# Patient Record
Sex: MALE | Race: WHITE | NOT HISPANIC OR LATINO | Employment: FULL TIME | URBAN - METROPOLITAN AREA
[De-identification: names, ages, dates, MRNs, and addresses within clinical notes are randomized per-mention and may not be internally consistent; named-entity substitution may affect disease eponyms.]

---

## 2018-03-19 ENCOUNTER — TRANSFERRED RECORDS (OUTPATIENT)
Dept: HEALTH INFORMATION MANAGEMENT | Facility: CLINIC | Age: 48
End: 2018-03-19

## 2018-03-20 ENCOUNTER — MEDICAL CORRESPONDENCE (OUTPATIENT)
Dept: HEALTH INFORMATION MANAGEMENT | Facility: CLINIC | Age: 48
End: 2018-03-20

## 2018-03-27 ENCOUNTER — TELEPHONE (OUTPATIENT)
Dept: RHEUMATOLOGY | Facility: CLINIC | Age: 48
End: 2018-03-27

## 2018-03-27 NOTE — TELEPHONE ENCOUNTER
Received referral from [facility] (sent to abstracting) to call and schedule appointment for patient to be seen win Rheumatology. Called and spoke with patient, patient was given a date and time that had been ok 'd by Dr. Bunn patient stated he has to work that day, another date was offered but patient stated he had a colonoscopy that day and third time and date was given but patient declined. Finally scheduled for August 6th, 2018 at 8:40 AM. [facility] had asked us to call them when appointment was made so I did call and let them know that I scheduled an appointment and explained why it was in August, the male tech I spoke with laughed and said that its ok.  Theresa Clay, KATELYNN  3/27/2018 4:06 PM

## 2018-08-06 ENCOUNTER — RADIANT APPOINTMENT (OUTPATIENT)
Dept: GENERAL RADIOLOGY | Facility: CLINIC | Age: 48
End: 2018-08-06
Attending: INTERNAL MEDICINE
Payer: COMMERCIAL

## 2018-08-06 ENCOUNTER — OFFICE VISIT (OUTPATIENT)
Dept: RHEUMATOLOGY | Facility: CLINIC | Age: 48
End: 2018-08-06
Payer: COMMERCIAL

## 2018-08-06 VITALS
WEIGHT: 187.2 LBS | HEART RATE: 84 BPM | RESPIRATION RATE: 16 BRPM | OXYGEN SATURATION: 99 % | DIASTOLIC BLOOD PRESSURE: 91 MMHG | SYSTOLIC BLOOD PRESSURE: 144 MMHG

## 2018-08-06 DIAGNOSIS — H20.9 UVEITIS: ICD-10-CM

## 2018-08-06 DIAGNOSIS — Z87.39 HX OF RHEUMATOID ARTHRITIS: ICD-10-CM

## 2018-08-06 DIAGNOSIS — H20.00 HLA-B27 ASSOCIATED ACUTE ANTERIOR UVEITIS: Primary | ICD-10-CM

## 2018-08-06 LAB
ALBUMIN SERPL-MCNC: 3.8 G/DL (ref 3.4–5)
ALP SERPL-CCNC: 70 U/L (ref 40–150)
ALT SERPL W P-5'-P-CCNC: 41 U/L (ref 0–70)
ANION GAP SERPL CALCULATED.3IONS-SCNC: 8 MMOL/L (ref 3–14)
AST SERPL W P-5'-P-CCNC: 24 U/L (ref 0–45)
B BURGDOR IGG+IGM SER QL: 0.06 (ref 0–0.89)
BASOPHILS # BLD AUTO: 0.1 10E9/L (ref 0–0.2)
BASOPHILS NFR BLD AUTO: 0.6 %
BILIRUB SERPL-MCNC: 0.5 MG/DL (ref 0.2–1.3)
BUN SERPL-MCNC: 14 MG/DL (ref 7–30)
CALCIUM SERPL-MCNC: 8.8 MG/DL (ref 8.5–10.1)
CHLORIDE SERPL-SCNC: 104 MMOL/L (ref 94–109)
CO2 SERPL-SCNC: 25 MMOL/L (ref 20–32)
CREAT SERPL-MCNC: 0.82 MG/DL (ref 0.66–1.25)
CRP SERPL-MCNC: 6.4 MG/L (ref 0–8)
DIFFERENTIAL METHOD BLD: NORMAL
EOSINOPHIL # BLD AUTO: 0.2 10E9/L (ref 0–0.7)
EOSINOPHIL NFR BLD AUTO: 3 %
ERYTHROCYTE [DISTWIDTH] IN BLOOD BY AUTOMATED COUNT: 12.9 % (ref 10–15)
ERYTHROCYTE [SEDIMENTATION RATE] IN BLOOD BY WESTERGREN METHOD: 8 MM/H (ref 0–20)
GFR SERPL CREATININE-BSD FRML MDRD: >90 ML/MIN/1.7M2
GLUCOSE SERPL-MCNC: 84 MG/DL (ref 70–99)
HBV CORE AB SERPL QL IA: NONREACTIVE
HBV SURFACE AG SERPL QL IA: NONREACTIVE
HCT VFR BLD AUTO: 44.8 % (ref 40–53)
HCV AB SERPL QL IA: NONREACTIVE
HGB BLD-MCNC: 15.1 G/DL (ref 13.3–17.7)
LYMPHOCYTES # BLD AUTO: 1.2 10E9/L (ref 0.8–5.3)
LYMPHOCYTES NFR BLD AUTO: 14.8 %
MCH RBC QN AUTO: 28.4 PG (ref 26.5–33)
MCHC RBC AUTO-ENTMCNC: 33.7 G/DL (ref 31.5–36.5)
MCV RBC AUTO: 84 FL (ref 78–100)
MONOCYTES # BLD AUTO: 1.1 10E9/L (ref 0–1.3)
MONOCYTES NFR BLD AUTO: 13.7 %
NEUTROPHILS # BLD AUTO: 5.4 10E9/L (ref 1.6–8.3)
NEUTROPHILS NFR BLD AUTO: 67.9 %
PLATELET # BLD AUTO: 366 10E9/L (ref 150–450)
POTASSIUM SERPL-SCNC: 4.7 MMOL/L (ref 3.4–5.3)
PROT SERPL-MCNC: 7.7 G/DL (ref 6.8–8.8)
RBC # BLD AUTO: 5.32 10E12/L (ref 4.4–5.9)
RHEUMATOID FACT SER NEPH-ACNC: <20 IU/ML (ref 0–20)
SODIUM SERPL-SCNC: 137 MMOL/L (ref 133–144)
WBC # BLD AUTO: 7.9 10E9/L (ref 4–11)

## 2018-08-06 PROCEDURE — 73130 X-RAY EXAM OF HAND: CPT | Mod: LT

## 2018-08-06 PROCEDURE — 86704 HEP B CORE ANTIBODY TOTAL: CPT | Performed by: INTERNAL MEDICINE

## 2018-08-06 PROCEDURE — 87340 HEPATITIS B SURFACE AG IA: CPT | Performed by: INTERNAL MEDICINE

## 2018-08-06 PROCEDURE — 86431 RHEUMATOID FACTOR QUANT: CPT | Performed by: INTERNAL MEDICINE

## 2018-08-06 PROCEDURE — 86200 CCP ANTIBODY: CPT | Performed by: INTERNAL MEDICINE

## 2018-08-06 PROCEDURE — 99244 OFF/OP CNSLTJ NEW/EST MOD 40: CPT | Performed by: INTERNAL MEDICINE

## 2018-08-06 PROCEDURE — 86618 LYME DISEASE ANTIBODY: CPT | Performed by: INTERNAL MEDICINE

## 2018-08-06 PROCEDURE — 85025 COMPLETE CBC W/AUTO DIFF WBC: CPT | Performed by: INTERNAL MEDICINE

## 2018-08-06 PROCEDURE — 86480 TB TEST CELL IMMUN MEASURE: CPT | Performed by: INTERNAL MEDICINE

## 2018-08-06 PROCEDURE — 71046 X-RAY EXAM CHEST 2 VIEWS: CPT | Mod: FY

## 2018-08-06 PROCEDURE — 86140 C-REACTIVE PROTEIN: CPT | Performed by: INTERNAL MEDICINE

## 2018-08-06 PROCEDURE — 86780 TREPONEMA PALLIDUM: CPT | Performed by: INTERNAL MEDICINE

## 2018-08-06 PROCEDURE — 80053 COMPREHEN METABOLIC PANEL: CPT | Performed by: INTERNAL MEDICINE

## 2018-08-06 PROCEDURE — 86803 HEPATITIS C AB TEST: CPT | Performed by: INTERNAL MEDICINE

## 2018-08-06 PROCEDURE — 85652 RBC SED RATE AUTOMATED: CPT | Performed by: INTERNAL MEDICINE

## 2018-08-06 PROCEDURE — 36415 COLL VENOUS BLD VENIPUNCTURE: CPT | Performed by: INTERNAL MEDICINE

## 2018-08-06 RX ORDER — LEFLUNOMIDE 20 MG/1
20 TABLET ORAL DAILY
Qty: 30 TABLET | Refills: 4 | Status: SHIPPED | OUTPATIENT
Start: 2018-08-06 | End: 2018-10-11

## 2018-08-06 NOTE — Clinical Note
Please fax my clinic note dated 8/6/2018 to Mr. Bingham's PCP and ophthalmologist:  Ophthalmologist: Dr. Luis Eduardo Young from [facility]   PCP: Hood Melo at [facility]  Thank you, Charles Bunn MD 8/6/2018 10:21 AM

## 2018-08-06 NOTE — PATIENT INSTRUCTIONS
Rheumatology    Dr. Charles Bunn         [facility]   (Monday)  [address]  [place] [address]       [facility]   (Tuesday)  [address]  [place] [address]    [facility]   (Wed., Thurs., and Friday)  [address] [place] [address]    Phone number: [phone]  Thank you for choosing [facility].  Theresa Clay CMA

## 2018-08-06 NOTE — MR AVS SNAPSHOT
After Visit Summary   8/6/2018    Bry Bingham    MRN: [id number]           Patient Information     Date Of Birth          1970        Visit Information        Provider Department      8/6/2018 8:40 AM Charles Bunn MD [facility] [place]        Today's Diagnoses     Uveitis    -  1    Hx of rheumatoid arthritis          Care Instructions    Rheumatology    Dr. Charles Muller [facility]   (Monday)  [address]  [place] [address]       [facility]   (Tuesday)  [address] [place] [address]    [facility]   (Wed., Thurs., and Friday)  [address] [place] [address]    Phone number: [phone]  Thank you for choosing [facility].  Theresa Clay CMA            Follow-ups after your visit        Your next 10 appointments already scheduled     Sep 10, 2018 11:00 AM CDT   LAB with NL LAB [facility] ([facility])    [address] [address] [phone]           Please do not eat 10-12 hours before your appointment if you are coming in fasting for labs on lipids, cholesterol, or glucose (sugar). This does not apply to pregnant women. Water, hot tea and black coffee (with nothing added) are okay. Do not drink other fluids, diet soda or chew gum.            Oct 08, 2018 11:00 AM CDT   LAB with NL LAB [facility] ([facility])    [address] [facility] [address] [phone]           Please do not eat 10-12 hours before your appointment if you are coming in fasting for labs on lipids, cholesterol, or glucose (sugar). This does not apply to pregnant women. Water, hot tea and black coffee (with nothing added) are okay. Do not drink other fluids, diet soda or chew gum.            Nov 12, 2018 11:00 AM CST   LAB with NL LAB [facility] ([facility])    [address] [address]   [phone]            Please do not eat 10-12 hours before your appointment if you are coming in fasting for labs on lipids, cholesterol, or glucose (sugar). This does not apply to pregnant women. Water, hot tea and black coffee (with nothing added) are okay. Do not drink other fluids, diet soda or chew gum.            Dec 10, 2018 11:00 AM CST   LAB with NL LAB [facility]   [facility]    [address] [facility] [address]   [phone]           Please do not eat 10-12 hours before your appointment if you are coming in fasting for labs on lipids, cholesterol, or glucose (sugar). This does not apply to pregnant women. Water, hot tea and black coffee (with nothing added) are okay. Do not drink other fluids, diet soda or chew gum.            Dec 17, 2018 11:20 AM CST   Return Visit with Charles Bunn MD   [facility] [place] ([facility])    [address] [facility] [id number] [phone]              Future tests that were ordered for you today     Open Standing Orders        Priority Remaining Interval Expires Ordered    CBC with platelets differential Routine 6/6 Every 4 Weeks 2/2/2019 8/6/2018    Creatinine Routine 6/6 Every 4 Weeks 2/2/2019 8/6/2018    Hepatic panel Routine 6/6 Every 4 Weeks 2/2/2019 8/6/2018          Open Future Orders        Priority Expected Expires Ordered    XR Hand Bilateral G/E 3 Views Routine 8/6/2018 8/6/2019 8/6/2018    XR Chest 2 Views Routine 8/6/2018 8/6/2019 8/6/2018            Who to contact     If you have questions or need follow up information about today's clinic visit or your schedule please contact [facility] directly at [phone].  Normal or non-critical lab and imaging results will be communicated to you by MyChart, letter or phone within 4 business days after the clinic has received the results. If you do not hear from us within 7 days, please contact the clinic through MyChart or phone. If you have  "a critical or abnormal lab result, we will notify you by phone as soon as possible.  Submit refill requests through [employer] or call your pharmacy and they will forward the refill request to us. Please allow 3 business days for your refill to be completed.          Additional Information About Your Visit        Fanboutshart Information     TourPal lets you send messages to your doctor, view your test results, renew your prescriptions, schedule appointments and more. To sign up, go to [email or web address] . Click on \"Log in\" on the left side of the screen, which will take you to the Welcome page. Then click on \"Sign up Now\" on the right side of the page.     You will be asked to enter the access code listed below, as well as some personal information. Please follow the directions to create your username and password.     Your access code is: [email or web address]  Expires: 2018  9:26 AM     Your access code will  in 90 days. If you need help or a new code, please call your [facility] clinic or [phone].        Care EveryWhere ID     This is your Care EveryWhere ID. This could be used by other organizations to access your [facility] medical records  [id number]        Your Vitals Were     Pulse Respirations Pulse Oximetry             84 16 99%          Blood Pressure from Last 3 Encounters:   18 (!) 144/91    Weight from Last 3 Encounters:   18 84.9 kg (187 lb 3.2 oz)              We Performed the Following     CBC with platelets differential     Comprehensive metabolic panel     CRP inflammation     Cyclic Citrullinated Peptide Antibody IgG     Erythrocyte sedimentation rate auto     Hepatitis B core antibody     Hepatitis B surface antigen     Hepatitis C Screen Reflex to HCV RNA Quant and Genotype     Lyme Disease Amy with reflex to WB Serum     Quantiferon TB Gold Plus     Rheumatoid factor     Treponema Abs w Reflex to RPR and Titer          Today's Medication Changes          These changes are " accurate as of 8/6/18  9:26 AM.  If you have any questions, ask your nurse or doctor.               Start taking these medicines.        Dose/Directions    leflunomide 20 MG tablet   Commonly known as:  ARAVA   Used for:  Uveitis   Started by:  Charles Bunn MD        Dose:  20 mg   Take 1 tablet (20 mg) by mouth daily   Quantity:  30 tablet   Refills:  4            Where to get your medicines      These medications were sent to [facility] - [place], [place] - [address] AT [facility] [address]  [address], [place] [address]     Phone:  [phone]     leflunomide 20 MG tablet                Primary Care Provider Office Phone # Fax #    Hood Melo PA-C [phone]       [facility] [address]  [facility] [address]        Equal Access to Services     [facility] CenterCHRISTOPHER : Hadii aad ku hadasho Soomaali, waaxda luqadaha, qaybta kaalmada adeegyada, waxay idiin hayaan david greggaraluis carlos go . So [facility] [phone].    ATENCIÓN: Si habla español, tiene a perez disposición servicios gratuitos de asistencia lingüística. Pranay al [phone].    We comply with applicable federal civil rights laws and [place] laws. We do not discriminate on the basis of race, color, national origin, age, disability, sex, sexual orientation, or gender identity.            Thank you!     Thank you for choosing [facility]  for your care. Our goal is always to provide you with excellent care. Hearing back from our patients is one way we can continue to improve our services. Please take a few minutes to complete the written survey that you may receive in the mail after your visit with us. Thank you!             Your Updated Medication List - Protect others around you: Learn how to safely use, store and throw away your medicines at www.disposemymeds.org.          This list is accurate as of 8/6/18  9:26 AM.  Always use your most recent med list.                    Brand Name Dispense Instructions for use Diagnosis    leflunomide 20 MG tablet    ARAVA    30 tablet    Take 1 tablet (20 mg) by mouth daily    Uveitis

## 2018-08-06 NOTE — PROGRESS NOTES
Rheumatology Clinic Visit      Bry Bingham MRN# [id number]   YOB: 1970 Age: 48 year old      Date of visit: 8/06/18   Referring provider: Dr. Luis Eduardo Young from [facility]   PCP: Hood Melo at [facility]    Chief Complaint   Patient presents with:  Consult    Assessment and Plan     1.  HLA-B27 associated recurrent left anterior uveitis: Acute onset per ophthalmologist.  HLA-B27 positive.  History of rheumatoid arthritis per patient report but no active synovitis on exam today.  The most common causes of anterior uveitis include idiopathic and HLA-B27 associated; with other causes including sarcoidosis, Behcet's, infectious, multiple sclerosis, etc.  Sudden onset unilateral anterior uveitis is suggestive of an HLA-B27 association.  Reportedly had uveitis since 2009; was tx'd with MTX successfully for many years but he stopped it because of associated fatigue and did well for about 3-4 years until he flared a few months ago and is still requiring steroid eye drops twice daily without complete control of left eye symptoms.  Currently without activity arthritis on exam today and mechanical joint symptoms.  Will check labs and x-rays as noted below.  We discussed immunosuppressive tx options including MTX SQ (he was on SQ in the past with associated fatigue, but only after taking for several years), leflunomide, AZA, MMF.  Start leflunomide today.  - Start leflunomide 20mg daily  - X-rays today: chest, bilateral hands  - Labs today: CBC, CMP, ESR, CRP, treponema ab with reflex to RPR and titer, Lyme ab, RF, CCP, Hepatitis B/C  - Labs monthly [id number]: CBC, Cr, Hepatic Panel    # Leflunomide (Arava) Risks and Benefits: The risks and benefits of leflunomide were discussed in detail and the patient verbalized understanding.  The risks discussed include, but are not limited to, the risk for hypersensitivity, anaphylaxis, anaphylactoid reactions, hepatotoxicity, infections,  interstitial lung disease, alopecia, rash, nausea, and diarrhea.  The impact on malignancies is not fully defined.   Alcohol should be avoided while taking leflunomide.  Pregnancy prevention and planning was discussed; it is recommended that women of childbearing potential use reliable contraception before, during, and for a period of 2 years after treatment with leflunomide; if pregnancy is planned within 2 years of discontinuing medication then women should undergo drug elimination procedures (i.e. cholestyramine). Routine laboratory monitoring is required during leflunomide therapy. I encouraged reviewing the package insert and asking any questions about the medication.      2. Hx of rheumatoid arthritis: No synovitis on exam today.  Check RF and CCP.  Check x-rays of the bilateral hands.     3. Chewing tobacco: Encouraged complete cessation and discussed the health benefits.      4. Elevated blood pressure: Patient to follow up with Primary Care provider regarding elevated blood pressure.     Mr. Bingham verbalized agreement with and understanding of the rational for the diagnosis and treatment plan.  All questions were answered to best of my ability and the patient's satisfaction. Mr. Bingham was advised to contact the clinic with any questions that may arise after the clinic visit.      Thank you for involving me in the care of the patient    Return to clinic: 3 - 4 months      HPI   Bry Bingham is a 48 year old male with a past medical history significant for iritis and rheumatoid arthritis who is seen in consultation at the request of Dr. Luis Eduardo Young from [facility] for evaluation of recurrent left acute anterior uveitis.    Today, Mr. Bingham reports 9 years of recurrent uveitis.  Also with pain in his hands and has been seen by rheumatologist in the past who prescribed methotrexate for control of his eye disease and his arthritis.  He was on methotrexate subcutaneous, of an unknown  dose.  He does not recall who his previous rheumatologist was.  Approximately 4 years ago he discontinue methotrexate because of associated fatigue, and he says that his rheumatologist was unwilling to change medications.  Then for the past 4 months he has been having worsening left eye uveitis symptoms.  He has been using steroid eyedrops with some benefit, but has been used still using it twice daily and he says that his ophthalmologist is not aware of this yet.  He would prefer to be on long-term immunosuppressive therapies to control the eye disease as it is significantly affecting his daily activities.  He is a .  Joint symptoms: Mild pain in his PIPs bilaterally that worsens with activity and by the end of the day.  Morning stiffness for no more than 20 minutes.  No other joint pain.    Denies fevers, chills, nausea, vomiting, constipation, diarrhea. No abdominal pain. No chest pain/pressure, palpitations, or shortness of breath. No LE swelling. No neck pain.  No lower back pain or lower back stiffness.  No oral or nasal sores.  No rash. No sicca symptoms.  No Raynaud's phenomenon symptoms.  Discoloration of the skin on the right second finger where he hit himself with a hammer recently, on accident    Tobacco: Chewing tobacco  EtOH: 1-2 drinks every 2 weeks  Drugs: None  Occupation:  for [employer]   GEN: No fevers, chills, night sweats, or weight change  SKIN: No itching, rashes, sores  HEENT: No epistaxis. No oral or nasal ulcers.  See HPI  CV: No chest pain, pressure, palpitations, or dyspnea on exertion.  PULM: No SOB, wheeze, cough.  GI: No nausea, vomiting, constipation, diarrhea. No blood in stool. No abdominal pain.  : No blood in urine.  MSK: See HPI.  NEURO: No numbness, tingling, or weakness.  ENDO: No heat/cold intolerance.  EXT: No LE swelling  PSYCH: Negative    Active Problem List   There is no problem list on file for this patient.    Past Medical History    History reviewed. No pertinent past medical history.  Past Surgical History   History reviewed. No pertinent surgical history.  Allergy   No Known Allergies  Current Medication List     Current Outpatient Prescriptions   Medication Sig     leflunomide (ARAVA) 20 MG tablet Take 1 tablet (20 mg) by mouth daily     No current facility-administered medications for this visit.          Social History   See HPI    Family History     Denies family history of rheumatologic disease    Physical Exam     Temp Readings from Last 3 Encounters:   No data found for Temp     BP Readings from Last 5 Encounters:   08/06/18 (!) 144/91     Pulse Readings from Last 1 Encounters:   08/06/18 84     Resp Readings from Last 1 Encounters:   08/06/18 16     There is no height or weight on file to calculate BMI.    GEN: NAD  HEENT: MMM. No oral lesions.  Bilateral eyes appear mildly injected.  CV: S1, S2. RRR. No m/r/g.  PULM: CTA bilaterally. No w/c.  ABD: +BS.   MSK: MCPs, PIPs, wrists, elbows, shoulders, knees, and ankles without swelling or tenderness to palpation.  Negative MCP and MTP squeeze.   Hips nontender to palpation. No dactylitis.    NEURO: UE and LE strengths 5/5 and equal bilaterally.   SKIN: No rash.  Right second finger with bruise on the distal aspect where he said that he hit himself with a hammer.  EXT: No LE edema  PSYCH: Alert. Appropriate.    Labs / Imaging (select studies)   [facility] labs on 12/3/2012: HLA-B27 positive    Immunization History     There is no immunization history on file for this patient.       Chart documentation done in part with Dragon Voice recognition Software. Although reviewed after completion, some word and grammatical error may remain.    Charles Bunn MD

## 2018-08-07 LAB
CCP AB SER IA-ACNC: 1 U/ML
T PALLIDUM AB SER QL: NONREACTIVE

## 2018-08-08 LAB
GAMMA INTERFERON BACKGROUND BLD IA-ACNC: 0.06 IU/ML
M TB IFN-G BLD-IMP: NEGATIVE
M TB IFN-G CD4+ BCKGRND COR BLD-ACNC: >10 IU/ML
MITOGEN IGNF BCKGRD COR BLD-ACNC: 0 IU/ML
MITOGEN IGNF BCKGRD COR BLD-ACNC: 0 IU/ML

## 2018-09-10 DIAGNOSIS — H20.00 HLA-B27 ASSOCIATED ACUTE ANTERIOR UVEITIS: ICD-10-CM

## 2018-09-10 LAB
ALBUMIN SERPL-MCNC: 3.6 G/DL (ref 3.4–5)
ALP SERPL-CCNC: 78 U/L (ref 40–150)
ALT SERPL W P-5'-P-CCNC: 50 U/L (ref 0–70)
AST SERPL W P-5'-P-CCNC: 35 U/L (ref 0–45)
BASOPHILS # BLD AUTO: 0.1 10E9/L (ref 0–0.2)
BASOPHILS NFR BLD AUTO: 0.9 %
BILIRUB DIRECT SERPL-MCNC: 0.2 MG/DL (ref 0–0.2)
BILIRUB SERPL-MCNC: 0.9 MG/DL (ref 0.2–1.3)
CREAT SERPL-MCNC: 0.68 MG/DL (ref 0.66–1.25)
DIFFERENTIAL METHOD BLD: NORMAL
EOSINOPHIL # BLD AUTO: 0.2 10E9/L (ref 0–0.7)
EOSINOPHIL NFR BLD AUTO: 3.5 %
ERYTHROCYTE [DISTWIDTH] IN BLOOD BY AUTOMATED COUNT: 13.3 % (ref 10–15)
GFR SERPL CREATININE-BSD FRML MDRD: >90 ML/MIN/1.7M2
HCT VFR BLD AUTO: 45.8 % (ref 40–53)
HGB BLD-MCNC: 15.4 G/DL (ref 13.3–17.7)
LYMPHOCYTES # BLD AUTO: 1.1 10E9/L (ref 0.8–5.3)
LYMPHOCYTES NFR BLD AUTO: 16.3 %
MCH RBC QN AUTO: 27.8 PG (ref 26.5–33)
MCHC RBC AUTO-ENTMCNC: 33.6 G/DL (ref 31.5–36.5)
MCV RBC AUTO: 83 FL (ref 78–100)
MONOCYTES # BLD AUTO: 1.3 10E9/L (ref 0–1.3)
MONOCYTES NFR BLD AUTO: 19.1 %
NEUTROPHILS # BLD AUTO: 4.2 10E9/L (ref 1.6–8.3)
NEUTROPHILS NFR BLD AUTO: 60.2 %
PLATELET # BLD AUTO: 329 10E9/L (ref 150–450)
PROT SERPL-MCNC: 7.6 G/DL (ref 6.8–8.8)
RBC # BLD AUTO: 5.53 10E12/L (ref 4.4–5.9)
WBC # BLD AUTO: 7 10E9/L (ref 4–11)

## 2018-09-10 PROCEDURE — 36415 COLL VENOUS BLD VENIPUNCTURE: CPT | Performed by: INTERNAL MEDICINE

## 2018-09-10 PROCEDURE — 85025 COMPLETE CBC W/AUTO DIFF WBC: CPT | Performed by: INTERNAL MEDICINE

## 2018-09-10 PROCEDURE — 82565 ASSAY OF CREATININE: CPT | Performed by: INTERNAL MEDICINE

## 2018-09-10 PROCEDURE — 80076 HEPATIC FUNCTION PANEL: CPT | Performed by: INTERNAL MEDICINE

## 2018-10-08 DIAGNOSIS — H20.00 HLA-B27 ASSOCIATED ACUTE ANTERIOR UVEITIS: ICD-10-CM

## 2018-10-08 LAB
ALBUMIN SERPL-MCNC: 3.6 G/DL (ref 3.4–5)
ALP SERPL-CCNC: 80 U/L (ref 40–150)
ALT SERPL W P-5'-P-CCNC: 71 U/L (ref 0–70)
AST SERPL W P-5'-P-CCNC: 43 U/L (ref 0–45)
BASOPHILS # BLD AUTO: 0.1 10E9/L (ref 0–0.2)
BASOPHILS NFR BLD AUTO: 1 %
BILIRUB DIRECT SERPL-MCNC: 0.2 MG/DL (ref 0–0.2)
BILIRUB SERPL-MCNC: 0.9 MG/DL (ref 0.2–1.3)
CREAT SERPL-MCNC: 0.87 MG/DL (ref 0.66–1.25)
DIFFERENTIAL METHOD BLD: NORMAL
EOSINOPHIL # BLD AUTO: 0.2 10E9/L (ref 0–0.7)
EOSINOPHIL NFR BLD AUTO: 3.9 %
ERYTHROCYTE [DISTWIDTH] IN BLOOD BY AUTOMATED COUNT: 13.4 % (ref 10–15)
GFR SERPL CREATININE-BSD FRML MDRD: >90 ML/MIN/1.7M2
HCT VFR BLD AUTO: 45.2 % (ref 40–53)
HGB BLD-MCNC: 15 G/DL (ref 13.3–17.7)
LYMPHOCYTES # BLD AUTO: 1.2 10E9/L (ref 0.8–5.3)
LYMPHOCYTES NFR BLD AUTO: 19.3 %
MCH RBC QN AUTO: 27.5 PG (ref 26.5–33)
MCHC RBC AUTO-ENTMCNC: 33.2 G/DL (ref 31.5–36.5)
MCV RBC AUTO: 83 FL (ref 78–100)
MONOCYTES # BLD AUTO: 1.1 10E9/L (ref 0–1.3)
MONOCYTES NFR BLD AUTO: 17.1 %
NEUTROPHILS # BLD AUTO: 3.7 10E9/L (ref 1.6–8.3)
NEUTROPHILS NFR BLD AUTO: 58.7 %
PLATELET # BLD AUTO: 310 10E9/L (ref 150–450)
PROT SERPL-MCNC: 7.6 G/DL (ref 6.8–8.8)
RBC # BLD AUTO: 5.45 10E12/L (ref 4.4–5.9)
WBC # BLD AUTO: 6.2 10E9/L (ref 4–11)

## 2018-10-08 PROCEDURE — 36415 COLL VENOUS BLD VENIPUNCTURE: CPT | Performed by: INTERNAL MEDICINE

## 2018-10-08 PROCEDURE — 82565 ASSAY OF CREATININE: CPT | Performed by: INTERNAL MEDICINE

## 2018-10-08 PROCEDURE — 85025 COMPLETE CBC W/AUTO DIFF WBC: CPT | Performed by: INTERNAL MEDICINE

## 2018-10-08 PROCEDURE — 80076 HEPATIC FUNCTION PANEL: CPT | Performed by: INTERNAL MEDICINE

## 2018-10-11 DIAGNOSIS — H20.00 HLA-B27 ASSOCIATED ACUTE ANTERIOR UVEITIS: ICD-10-CM

## 2018-10-11 RX ORDER — LEFLUNOMIDE 20 MG/1
20 TABLET ORAL EVERY OTHER DAY
Qty: 15 TABLET | Refills: 2 | Status: SHIPPED | OUTPATIENT
Start: 2018-10-11 | End: 2018-12-17

## 2018-10-11 NOTE — PROGRESS NOTES
"[employer] message sent:  \"Mr. Bingham,     Liver enzyme ALT is just above the normal range, but higher than your baseline.  Therefore, please change leflunomide from 20mg every day, to be 20mg every other day.      Leflunomide 20mg EVERY OTHER DAY.    Please let me know if you have any questions.    Sincerely,  Charles Bunn MD  10/11/2018 12:06 PM\""

## 2018-11-16 DIAGNOSIS — H20.00 HLA-B27 ASSOCIATED ACUTE ANTERIOR UVEITIS: ICD-10-CM

## 2018-11-16 LAB
ALBUMIN SERPL-MCNC: 3.5 G/DL (ref 3.4–5)
ALP SERPL-CCNC: 72 U/L (ref 40–150)
ALT SERPL W P-5'-P-CCNC: 49 U/L (ref 0–70)
AST SERPL W P-5'-P-CCNC: 29 U/L (ref 0–45)
BASOPHILS # BLD AUTO: 0.1 10E9/L (ref 0–0.2)
BASOPHILS NFR BLD AUTO: 1.1 %
BILIRUB DIRECT SERPL-MCNC: 0.2 MG/DL (ref 0–0.2)
BILIRUB SERPL-MCNC: 0.7 MG/DL (ref 0.2–1.3)
CREAT SERPL-MCNC: 1 MG/DL (ref 0.66–1.25)
DIFFERENTIAL METHOD BLD: NORMAL
EOSINOPHIL # BLD AUTO: 0.3 10E9/L (ref 0–0.7)
EOSINOPHIL NFR BLD AUTO: 4.3 %
ERYTHROCYTE [DISTWIDTH] IN BLOOD BY AUTOMATED COUNT: 13.7 % (ref 10–15)
GFR SERPL CREATININE-BSD FRML MDRD: 79 ML/MIN/1.7M2
HCT VFR BLD AUTO: 44.8 % (ref 40–53)
HGB BLD-MCNC: 14.9 G/DL (ref 13.3–17.7)
LYMPHOCYTES # BLD AUTO: 1.6 10E9/L (ref 0.8–5.3)
LYMPHOCYTES NFR BLD AUTO: 24.7 %
MCH RBC QN AUTO: 27.5 PG (ref 26.5–33)
MCHC RBC AUTO-ENTMCNC: 33.3 G/DL (ref 31.5–36.5)
MCV RBC AUTO: 83 FL (ref 78–100)
MONOCYTES # BLD AUTO: 1.1 10E9/L (ref 0–1.3)
MONOCYTES NFR BLD AUTO: 17.7 %
NEUTROPHILS # BLD AUTO: 3.3 10E9/L (ref 1.6–8.3)
NEUTROPHILS NFR BLD AUTO: 52.2 %
PLATELET # BLD AUTO: 315 10E9/L (ref 150–450)
PROT SERPL-MCNC: 7.3 G/DL (ref 6.8–8.8)
RBC # BLD AUTO: 5.42 10E12/L (ref 4.4–5.9)
WBC # BLD AUTO: 6.3 10E9/L (ref 4–11)

## 2018-11-16 PROCEDURE — 85025 COMPLETE CBC W/AUTO DIFF WBC: CPT | Performed by: INTERNAL MEDICINE

## 2018-11-16 PROCEDURE — 80076 HEPATIC FUNCTION PANEL: CPT | Performed by: INTERNAL MEDICINE

## 2018-11-16 PROCEDURE — 36415 COLL VENOUS BLD VENIPUNCTURE: CPT | Performed by: INTERNAL MEDICINE

## 2018-11-16 PROCEDURE — 82565 ASSAY OF CREATININE: CPT | Performed by: INTERNAL MEDICINE

## 2018-12-07 DIAGNOSIS — H20.00 HLA-B27 ASSOCIATED ACUTE ANTERIOR UVEITIS: ICD-10-CM

## 2018-12-07 LAB
ALBUMIN SERPL-MCNC: 3.5 G/DL (ref 3.4–5)
ALP SERPL-CCNC: 69 U/L (ref 40–150)
ALT SERPL W P-5'-P-CCNC: 50 U/L (ref 0–70)
AST SERPL W P-5'-P-CCNC: 28 U/L (ref 0–45)
BASOPHILS # BLD AUTO: 0.1 10E9/L (ref 0–0.2)
BASOPHILS NFR BLD AUTO: 0.7 %
BILIRUB DIRECT SERPL-MCNC: 0.2 MG/DL (ref 0–0.2)
BILIRUB SERPL-MCNC: 0.6 MG/DL (ref 0.2–1.3)
CREAT SERPL-MCNC: 1.08 MG/DL (ref 0.66–1.25)
DIFFERENTIAL METHOD BLD: NORMAL
EOSINOPHIL # BLD AUTO: 0.4 10E9/L (ref 0–0.7)
EOSINOPHIL NFR BLD AUTO: 4.9 %
ERYTHROCYTE [DISTWIDTH] IN BLOOD BY AUTOMATED COUNT: 13.9 % (ref 10–15)
GFR SERPL CREATININE-BSD FRML MDRD: 72 ML/MIN/1.7M2
HCT VFR BLD AUTO: 44.5 % (ref 40–53)
HGB BLD-MCNC: 14.5 G/DL (ref 13.3–17.7)
LYMPHOCYTES # BLD AUTO: 1.8 10E9/L (ref 0.8–5.3)
LYMPHOCYTES NFR BLD AUTO: 25.5 %
MCH RBC QN AUTO: 27.5 PG (ref 26.5–33)
MCHC RBC AUTO-ENTMCNC: 32.6 G/DL (ref 31.5–36.5)
MCV RBC AUTO: 84 FL (ref 78–100)
MONOCYTES # BLD AUTO: 1.2 10E9/L (ref 0–1.3)
MONOCYTES NFR BLD AUTO: 16.4 %
NEUTROPHILS # BLD AUTO: 3.8 10E9/L (ref 1.6–8.3)
NEUTROPHILS NFR BLD AUTO: 52.5 %
PLATELET # BLD AUTO: 298 10E9/L (ref 150–450)
PROT SERPL-MCNC: 7 G/DL (ref 6.8–8.8)
RBC # BLD AUTO: 5.27 10E12/L (ref 4.4–5.9)
WBC # BLD AUTO: 7.1 10E9/L (ref 4–11)

## 2018-12-07 PROCEDURE — 82565 ASSAY OF CREATININE: CPT | Performed by: INTERNAL MEDICINE

## 2018-12-07 PROCEDURE — 85025 COMPLETE CBC W/AUTO DIFF WBC: CPT | Performed by: INTERNAL MEDICINE

## 2018-12-07 PROCEDURE — 80076 HEPATIC FUNCTION PANEL: CPT | Performed by: INTERNAL MEDICINE

## 2018-12-07 PROCEDURE — 36415 COLL VENOUS BLD VENIPUNCTURE: CPT | Performed by: INTERNAL MEDICINE

## 2018-12-17 ENCOUNTER — OFFICE VISIT (OUTPATIENT)
Dept: RHEUMATOLOGY | Facility: CLINIC | Age: 48
End: 2018-12-17
Payer: COMMERCIAL

## 2018-12-17 VITALS
DIASTOLIC BLOOD PRESSURE: 98 MMHG | WEIGHT: 186 LBS | SYSTOLIC BLOOD PRESSURE: 156 MMHG | OXYGEN SATURATION: 97 % | HEART RATE: 72 BPM | TEMPERATURE: 98 F

## 2018-12-17 DIAGNOSIS — Z79.899 HIGH RISK MEDICATIONS (NOT ANTICOAGULANTS) LONG-TERM USE: ICD-10-CM

## 2018-12-17 DIAGNOSIS — Z23 NEED FOR VACCINATION: ICD-10-CM

## 2018-12-17 DIAGNOSIS — H20.00 HLA-B27 ASSOCIATED ACUTE ANTERIOR UVEITIS: Primary | ICD-10-CM

## 2018-12-17 PROCEDURE — 90471 IMMUNIZATION ADMIN: CPT | Performed by: INTERNAL MEDICINE

## 2018-12-17 PROCEDURE — 99213 OFFICE O/P EST LOW 20 MIN: CPT | Performed by: INTERNAL MEDICINE

## 2018-12-17 PROCEDURE — 90670 PCV13 VACCINE IM: CPT | Performed by: INTERNAL MEDICINE

## 2018-12-17 RX ORDER — LEFLUNOMIDE 10 MG/1
10 TABLET ORAL DAILY
Qty: 90 TABLET | Refills: 2 | Status: SHIPPED | OUTPATIENT
Start: 2018-12-17 | End: 2019-05-10

## 2018-12-17 ASSESSMENT — PAIN SCALES - GENERAL: PAINLEVEL: NO PAIN (0)

## 2018-12-17 NOTE — PROGRESS NOTES
Rheumatology Clinic Visit      Bry Bingham MRN# [id number]   YOB: 1970 Age: 48 year old      Date of visit: 12/17/18   Ophthalmologist: Dr. Luis Eduardo Young from [facility]   PCP: Hood Melo at [facility]    Chief Complaint   Patient presents with:  RECHECK: HLA-B27 associated acute anterior uveitis     Assessment and Plan     1.  HLA-B27 associated recurrent left anterior uveitis: Acute onset per ophthalmologist.  HLA-B27 positive.  History of rheumatoid arthritis per patient report but no active synovitis on exam today.  The most common causes of anterior uveitis include idiopathic and HLA-B27 associated; with other causes including sarcoidosis, Behcet's, infectious, multiple sclerosis, etc.  Sudden onset unilateral anterior uveitis is suggestive of an HLA-B27 association.  Reportedly had uveitis since 2009; was tx'd with MTX successfully for many years but he stopped it because of associated fatigue and did well for about 3-4 years until he flared a few months ago and is still requiring steroid eye drops twice daily without complete control of left eye symptoms.  No rheumatologic disease identified associated with the uveitis.  Leflunomide 20 mg daily was used with benefit, but resulted in ALT elevation so the dose was reduced to 20 mg every other day with good control of his uveitis per patient and normal labs.  Change leflunomide to 10 mg daily to simplify the regimen.    - Change leflunomide from 20 mg every other day, to 10 mg daily   - Labs monthly [id number], then every 3 months thereafter: CBC, Cr, Hepatic Panel    2. Hx of rheumatoid arthritis: No synovitis on exam today.  RF and CCP negative.    3. Chewing tobacco: Encouraged complete cessation and discussed the health benefits.      4. Elevated blood pressure: Patient to follow up with Primary Care provider regarding elevated blood pressure. He reports having normal BP at work and his last PCP office  visit.    5.  Vaccinations: Vaccinations reviewed with Mr. Bingham.  Risks and benefits of vaccinations were discussed.    - Influenza: up to date  - Nogiegk56: will receive today  - Vmtcuvugy55: to receive at least 8 weeks after [id number] is administered  - Shingrix: 1st dose received in 11/2018 with his PCP; and he plans to receive the second dose 2 months after his first dose    Mr. Bingham verbalized agreement with and understanding of the rational for the diagnosis and treatment plan.  All questions were answered to best of my ability and the patient's satisfaction. Mr. Bingham was advised to contact the clinic with any questions that may arise after the clinic visit.      Thank you for involving me in the care of the patient    Return to clinic: May 2019      HPI   Bry Bingham is a 48 year old male with a past medical history significant for iritis and rheumatoid arthritis who is seen for follow-up of recurrent left acute anterior uveitis.    Today, Mr. Bingham reports that he is doing great.  Tolerating leflunomide 20 mg every other day.  He reports that his uveitis was evaluated 1.5 months ago by his ophthalmologist and he was told that everything looked okay.  He reports having no eye symptoms at this time.      Denies fevers, chills, nausea, vomiting, constipation, diarrhea. No abdominal pain. No chest pain/pressure, palpitations, or shortness of breath. No LE swelling. No neck pain.  No lower back pain or lower back stiffness.  No oral or nasal sores.  No rash. No sicca symptoms.  No Raynaud's phenomenon symptoms.     Tobacco: Chewing tobacco  EtOH: 1-2 drinks every 2 weeks  Drugs: None  Occupation:  for [employer]   GEN: No fevers, chills, night sweats, or weight change  SKIN: No itching, rashes, sores  HEENT:  No oral or nasal ulcers.  See HPI  CV: No chest pain, pressure, palpitations, or dyspnea on exertion.  PULM: No SOB, wheeze, cough.  GI: No nausea, vomiting, constipation, diarrhea. No  blood in stool. No abdominal pain.  : No blood in urine.  MSK: See HPI.  NEURO: No numbness, tingling, or weakness.  EXT: No LE swelling  PSYCH: Negative    Active Problem List     Patient Active Problem List   Diagnosis     HLA-B27 associated acute anterior uveitis     Past Medical History   No past medical history on file.  Past Surgical History   No past surgical history on file.  Allergy   No Known Allergies  Current Medication List     Current Outpatient Medications   Medication Sig     leflunomide (ARAVA) 20 MG tablet Take 1 tablet (20 mg) by mouth every other day     No current facility-administered medications for this visit.          Social History   See HPI    Family History     Denies family history of rheumatologic disease    Physical Exam     Temp Readings from Last 3 Encounters:   12/17/18 98  F (36.7  C) (Oral)     BP Readings from Last 5 Encounters:   12/17/18 (!) 157/100   08/06/18 (!) 144/91     Pulse Readings from Last 1 Encounters:   12/17/18 72     Resp Readings from Last 1 Encounters:   08/06/18 16     There is no height or weight on file to calculate BMI.    GEN: NAD  HEENT: MMM. No oral lesions.  Anicteric noninjected sclera bilaterally.  CV: S1, S2. RRR. No m/r/g.  PULM: CTA bilaterally. No w/c.  MSK: MCPs, PIPs, wrists, elbows, shoulders, knees, and ankles without swelling or tenderness to palpation.  Negative MCP and MTP squeeze.   Hips nontender to palpation.   NEURO: UE and LE strengths 5/5 and equal bilaterally.   SKIN: No rash.  No nail pitting.  EXT: No LE edema  PSYCH: Alert. Appropriate.    Labs / Imaging (select studies)   [facility] labs on 12/3/2012: HLA-B27 positive    RF/CCP  Recent Labs   Lab Test 08/06/18  0929   CCPIGG 1   RHF <20     CBC  Recent Labs   Lab Test 12/07/18  1053 11/16/18  1052 10/08/18  1052   WBC 7.1 6.3 6.2   RBC 5.27 5.42 5.45   HGB 14.5 14.9 15.0   HCT 44.5 44.8 45.2   MCV 84 83 83   RDW 13.9 13.7 13.4    315 310   MCH 27.5 27.5 27.5   MCHC  32.6 33.3 33.2   NEUTROPHIL 52.5 52.2 58.7   LYMPH 25.5 24.7 19.3   MONOCYTE 16.4 17.7 17.1   EOSINOPHIL 4.9 4.3 3.9   BASOPHIL 0.7 1.1 1.0   ANEU 3.8 3.3 3.7   ALYM 1.8 1.6 1.2   AURORA 1.2 1.1 1.1   AEOS 0.4 0.3 0.2   ABAS 0.1 0.1 0.1     CMP  Recent Labs   Lab Test 12/07/18  1053 11/16/18  1052 10/08/18  1052  08/06/18  0929   NA  --   --   --   --  137   POTASSIUM  --   --   --   --  4.7   CHLORIDE  --   --   --   --  104   CO2  --   --   --   --  25   ANIONGAP  --   --   --   --  8   GLC  --   --   --   --  84   BUN  --   --   --   --  14   CR 1.08 1.00 0.87   < > 0.82   GFRESTIMATED 72 79 >90   < > >90   GFRESTBLACK 87 >90 >90   < > >90   NICOLA  --   --   --   --  8.8   BILITOTAL 0.6 0.7 0.9   < > 0.5   ALBUMIN 3.5 3.5 3.6   < > 3.8   PROTTOTAL 7.0 7.3 7.6   < > 7.7   ALKPHOS 69 72 80   < > 70   AST 28 29 43   < > 24   ALT 50 49 71*   < > 41    < > = values in this interval not displayed.     Calcium/VitaminD  Recent Labs   Lab Test 08/06/18  0929   NICOLA 8.8     ESR/CRP  Recent Labs   Lab Test 08/06/18  0929   SED 8   CRP 6.4     Hepatitis B  Recent Labs   Lab Test 08/06/18  0929   HBCAB Nonreactive   HEPBANG Nonreactive     Hepatitis C  Recent Labs   Lab Test 08/06/18  0929   HCVAB Nonreactive     Lyme ab screening  Recent Labs   Lab Test 08/06/18  0929   LYMEGM 0.06     Tuberculosis Screening  Recent Labs   Lab Test 08/06/18  0929   TBRES Negative     Immunization History     There is no immunization history on file for this patient.       Chart documentation done in part with Dragon Voice recognition Software. Although reviewed after completion, some word and grammatical error may remain.    Charles Bunn MD

## 2018-12-17 NOTE — NURSING NOTE
Chief Complaint   Patient presents with     RECHECK     HLA-B27 associated acute anterior uveitis        Initial BP (!) 157/100   Pulse 72   Temp 98  F (36.7  C) (Oral)   Wt 84.4 kg (186 lb)   SpO2 97%  There is no height or weight on file to calculate BMI.  BP completed using cuff size: regular         RAPID3 (0-30) Cumulative Score  0.3          RAPID3 Weighted Score (divide #4 by 3 and that is the weighted score)  0.1

## 2018-12-31 DIAGNOSIS — H20.00 HLA-B27 ASSOCIATED ACUTE ANTERIOR UVEITIS: ICD-10-CM

## 2018-12-31 RX ORDER — LEFLUNOMIDE 10 MG/1
10 TABLET ORAL DAILY
Qty: 90 TABLET | Refills: 2 | Status: CANCELLED | OUTPATIENT
Start: 2018-12-31

## 2018-12-31 NOTE — TELEPHONE ENCOUNTER
Requested Prescriptions   Pending Prescriptions Disp Refills     leflunomide (ARAVA) 10 MG tablet  Last Written Prescription Date:  11/29/18  Last Fill Quantity: 30,  # refills: 0   Last office visit: 12/17/2018 with prescribing provider:  RYANNE Bunn   Future Office Visit:     90 tablet 2     Sig: Take 1 tablet (10 mg) by mouth daily    There is no refill protocol information for this order

## 2019-01-02 NOTE — TELEPHONE ENCOUNTER
Spoke with Navya at the pharmacy, patient has picked up medication on 12/17/18.  Theresa Clay CMA Rheumatology  1/2/2019 11:21 AM

## 2019-01-04 DIAGNOSIS — H20.00 HLA-B27 ASSOCIATED ACUTE ANTERIOR UVEITIS: ICD-10-CM

## 2019-01-04 LAB
ALBUMIN SERPL-MCNC: 3.6 G/DL (ref 3.4–5)
ALP SERPL-CCNC: 69 U/L (ref 40–150)
ALT SERPL W P-5'-P-CCNC: 40 U/L (ref 0–70)
AST SERPL W P-5'-P-CCNC: 29 U/L (ref 0–45)
BASOPHILS # BLD AUTO: 0.1 10E9/L (ref 0–0.2)
BASOPHILS NFR BLD AUTO: 0.7 %
BILIRUB DIRECT SERPL-MCNC: 0.2 MG/DL (ref 0–0.2)
BILIRUB SERPL-MCNC: 1 MG/DL (ref 0.2–1.3)
CREAT SERPL-MCNC: 1.04 MG/DL (ref 0.66–1.25)
DIFFERENTIAL METHOD BLD: NORMAL
EOSINOPHIL # BLD AUTO: 0.2 10E9/L (ref 0–0.7)
EOSINOPHIL NFR BLD AUTO: 3.4 %
ERYTHROCYTE [DISTWIDTH] IN BLOOD BY AUTOMATED COUNT: 13.5 % (ref 10–15)
GFR SERPL CREATININE-BSD FRML MDRD: 83 ML/MIN/{1.73_M2}
HCT VFR BLD AUTO: 43.2 % (ref 40–53)
HGB BLD-MCNC: 14.3 G/DL (ref 13.3–17.7)
LYMPHOCYTES # BLD AUTO: 1.3 10E9/L (ref 0.8–5.3)
LYMPHOCYTES NFR BLD AUTO: 18.5 %
MCH RBC QN AUTO: 27.6 PG (ref 26.5–33)
MCHC RBC AUTO-ENTMCNC: 33.1 G/DL (ref 31.5–36.5)
MCV RBC AUTO: 83 FL (ref 78–100)
MONOCYTES # BLD AUTO: 1.3 10E9/L (ref 0–1.3)
MONOCYTES NFR BLD AUTO: 17.8 %
NEUTROPHILS # BLD AUTO: 4.2 10E9/L (ref 1.6–8.3)
NEUTROPHILS NFR BLD AUTO: 59.6 %
PLATELET # BLD AUTO: 295 10E9/L (ref 150–450)
PROT SERPL-MCNC: 7.1 G/DL (ref 6.8–8.8)
RBC # BLD AUTO: 5.18 10E12/L (ref 4.4–5.9)
WBC # BLD AUTO: 7.1 10E9/L (ref 4–11)

## 2019-01-04 PROCEDURE — 85025 COMPLETE CBC W/AUTO DIFF WBC: CPT | Performed by: INTERNAL MEDICINE

## 2019-01-04 PROCEDURE — 80076 HEPATIC FUNCTION PANEL: CPT | Performed by: INTERNAL MEDICINE

## 2019-01-04 PROCEDURE — 82565 ASSAY OF CREATININE: CPT | Performed by: INTERNAL MEDICINE

## 2019-01-04 PROCEDURE — 36415 COLL VENOUS BLD VENIPUNCTURE: CPT | Performed by: INTERNAL MEDICINE

## 2019-02-04 DIAGNOSIS — H20.00 HLA-B27 ASSOCIATED ACUTE ANTERIOR UVEITIS: ICD-10-CM

## 2019-02-04 DIAGNOSIS — Z79.899 HIGH RISK MEDICATIONS (NOT ANTICOAGULANTS) LONG-TERM USE: ICD-10-CM

## 2019-02-04 LAB
ALBUMIN SERPL-MCNC: 3.6 G/DL (ref 3.4–5)
ALP SERPL-CCNC: 72 U/L (ref 40–150)
ALT SERPL W P-5'-P-CCNC: 33 U/L (ref 0–70)
AST SERPL W P-5'-P-CCNC: 31 U/L (ref 0–45)
BASOPHILS # BLD AUTO: 0.1 10E9/L (ref 0–0.2)
BASOPHILS NFR BLD AUTO: 1.2 %
BILIRUB DIRECT SERPL-MCNC: 0.2 MG/DL (ref 0–0.2)
BILIRUB SERPL-MCNC: 0.6 MG/DL (ref 0.2–1.3)
CREAT SERPL-MCNC: 0.82 MG/DL (ref 0.66–1.25)
DIFFERENTIAL METHOD BLD: NORMAL
EOSINOPHIL # BLD AUTO: 0.2 10E9/L (ref 0–0.7)
EOSINOPHIL NFR BLD AUTO: 2.5 %
ERYTHROCYTE [DISTWIDTH] IN BLOOD BY AUTOMATED COUNT: 13.7 % (ref 10–15)
GFR SERPL CREATININE-BSD FRML MDRD: >90 ML/MIN/{1.73_M2}
HCT VFR BLD AUTO: 45.1 % (ref 40–53)
HGB BLD-MCNC: 14.7 G/DL (ref 13.3–17.7)
LYMPHOCYTES # BLD AUTO: 1 10E9/L (ref 0.8–5.3)
LYMPHOCYTES NFR BLD AUTO: 14.2 %
MCH RBC QN AUTO: 27.6 PG (ref 26.5–33)
MCHC RBC AUTO-ENTMCNC: 32.6 G/DL (ref 31.5–36.5)
MCV RBC AUTO: 85 FL (ref 78–100)
MONOCYTES # BLD AUTO: 1.1 10E9/L (ref 0–1.3)
MONOCYTES NFR BLD AUTO: 15.9 %
NEUTROPHILS # BLD AUTO: 4.4 10E9/L (ref 1.6–8.3)
NEUTROPHILS NFR BLD AUTO: 66.2 %
PLATELET # BLD AUTO: 319 10E9/L (ref 150–450)
PROT SERPL-MCNC: 7.3 G/DL (ref 6.8–8.8)
RBC # BLD AUTO: 5.32 10E12/L (ref 4.4–5.9)
WBC # BLD AUTO: 6.7 10E9/L (ref 4–11)

## 2019-02-04 PROCEDURE — 80076 HEPATIC FUNCTION PANEL: CPT | Performed by: INTERNAL MEDICINE

## 2019-02-04 PROCEDURE — 85025 COMPLETE CBC W/AUTO DIFF WBC: CPT | Performed by: INTERNAL MEDICINE

## 2019-02-04 PROCEDURE — 36415 COLL VENOUS BLD VENIPUNCTURE: CPT | Performed by: INTERNAL MEDICINE

## 2019-02-04 PROCEDURE — 82565 ASSAY OF CREATININE: CPT | Performed by: INTERNAL MEDICINE

## 2019-05-03 DIAGNOSIS — Z79.899 HIGH RISK MEDICATIONS (NOT ANTICOAGULANTS) LONG-TERM USE: ICD-10-CM

## 2019-05-03 DIAGNOSIS — H20.00 HLA-B27 ASSOCIATED ACUTE ANTERIOR UVEITIS: ICD-10-CM

## 2019-05-03 LAB
ALBUMIN SERPL-MCNC: 3.6 G/DL (ref 3.4–5)
ALP SERPL-CCNC: 57 U/L (ref 40–150)
ALT SERPL W P-5'-P-CCNC: 36 U/L (ref 0–70)
AST SERPL W P-5'-P-CCNC: 21 U/L (ref 0–45)
BASOPHILS # BLD AUTO: 0.1 10E9/L (ref 0–0.2)
BASOPHILS NFR BLD AUTO: 0.8 %
BILIRUB DIRECT SERPL-MCNC: 0.2 MG/DL (ref 0–0.2)
BILIRUB SERPL-MCNC: 0.6 MG/DL (ref 0.2–1.3)
CREAT SERPL-MCNC: 1.12 MG/DL (ref 0.66–1.25)
DIFFERENTIAL METHOD BLD: NORMAL
EOSINOPHIL # BLD AUTO: 0.2 10E9/L (ref 0–0.7)
EOSINOPHIL NFR BLD AUTO: 3.1 %
ERYTHROCYTE [DISTWIDTH] IN BLOOD BY AUTOMATED COUNT: 13.5 % (ref 10–15)
GFR SERPL CREATININE-BSD FRML MDRD: 76 ML/MIN/{1.73_M2}
HCT VFR BLD AUTO: 46.5 % (ref 40–53)
HGB BLD-MCNC: 15.6 G/DL (ref 13.3–17.7)
LYMPHOCYTES # BLD AUTO: 1.2 10E9/L (ref 0.8–5.3)
LYMPHOCYTES NFR BLD AUTO: 18.9 %
MCH RBC QN AUTO: 28.3 PG (ref 26.5–33)
MCHC RBC AUTO-ENTMCNC: 33.5 G/DL (ref 31.5–36.5)
MCV RBC AUTO: 84 FL (ref 78–100)
MONOCYTES # BLD AUTO: 1 10E9/L (ref 0–1.3)
MONOCYTES NFR BLD AUTO: 15.3 %
NEUTROPHILS # BLD AUTO: 4 10E9/L (ref 1.6–8.3)
NEUTROPHILS NFR BLD AUTO: 61.9 %
PLATELET # BLD AUTO: 282 10E9/L (ref 150–450)
PROT SERPL-MCNC: 7.1 G/DL (ref 6.8–8.8)
RBC # BLD AUTO: 5.51 10E12/L (ref 4.4–5.9)
WBC # BLD AUTO: 6.4 10E9/L (ref 4–11)

## 2019-05-03 PROCEDURE — 85025 COMPLETE CBC W/AUTO DIFF WBC: CPT | Performed by: INTERNAL MEDICINE

## 2019-05-03 PROCEDURE — 82565 ASSAY OF CREATININE: CPT | Performed by: INTERNAL MEDICINE

## 2019-05-03 PROCEDURE — 80076 HEPATIC FUNCTION PANEL: CPT | Performed by: INTERNAL MEDICINE

## 2019-05-03 PROCEDURE — 36415 COLL VENOUS BLD VENIPUNCTURE: CPT | Performed by: INTERNAL MEDICINE

## 2019-05-10 ENCOUNTER — OFFICE VISIT (OUTPATIENT)
Dept: RHEUMATOLOGY | Facility: CLINIC | Age: 49
End: 2019-05-10
Payer: COMMERCIAL

## 2019-05-10 VITALS
OXYGEN SATURATION: 99 % | DIASTOLIC BLOOD PRESSURE: 88 MMHG | WEIGHT: 184.8 LBS | SYSTOLIC BLOOD PRESSURE: 136 MMHG | HEART RATE: 77 BPM

## 2019-05-10 DIAGNOSIS — Z79.899 HIGH RISK MEDICATIONS (NOT ANTICOAGULANTS) LONG-TERM USE: ICD-10-CM

## 2019-05-10 DIAGNOSIS — Z23 NEED FOR PROPHYLACTIC VACCINATION AGAINST STREPTOCOCCUS PNEUMONIAE (PNEUMOCOCCUS): ICD-10-CM

## 2019-05-10 DIAGNOSIS — H20.00 HLA-B27 ASSOCIATED ACUTE ANTERIOR UVEITIS: Primary | ICD-10-CM

## 2019-05-10 PROCEDURE — 99214 OFFICE O/P EST MOD 30 MIN: CPT | Mod: 25 | Performed by: INTERNAL MEDICINE

## 2019-05-10 PROCEDURE — 90471 IMMUNIZATION ADMIN: CPT | Performed by: INTERNAL MEDICINE

## 2019-05-10 PROCEDURE — 90732 PPSV23 VACC 2 YRS+ SUBQ/IM: CPT | Performed by: INTERNAL MEDICINE

## 2019-05-10 RX ORDER — SULFASALAZINE 500 MG/1
TABLET, DELAYED RELEASE ORAL
Qty: 120 TABLET | Refills: 3 | Status: SHIPPED | OUTPATIENT
Start: 2019-05-10 | End: 2019-08-02

## 2019-05-10 RX ORDER — LEFLUNOMIDE 10 MG/1
10 TABLET ORAL DAILY
Qty: 90 TABLET | Refills: 2 | Status: SHIPPED | OUTPATIENT
Start: 2019-05-10 | End: 2019-11-22

## 2019-05-10 NOTE — PATIENT INSTRUCTIONS
Rheumatology    Dr. Charles Bunn         [facility]   (Monday)  [address]  [place] [address]       [facility]   (Tuesday)  [address]  [place] [address]    [facility]   (Wed., Thurs., and Friday)  [address] [place] [address]    Phone number: [phone]  Thank you for choosing [facility].  Theresa Clay CMA

## 2019-05-10 NOTE — NURSING NOTE
RAPID3 (0-30) Cumulative Score  1.3          RAPID3 Weighted Score (divide #4 by 3 and that is the weighted score)  0.43       Theresa Clay [facility] Rheumatology  5/10/2019 10:55 AM

## 2019-05-10 NOTE — PROGRESS NOTES
Rheumatology Clinic Visit      Bry Bingham MRN# [id number]   YOB: 1970 Age: 49 year old      Date of visit: 5/10/19   Ophthalmologist: Dr. Luis Eduardo Young from [facility]   PCP: Hood Melo at [facility]    Chief Complaint   Patient presents with:  Arthritis: patient has pain and stiffness in hands, otherwise good    Assessment and Plan     1.  HLA-B27 associated recurrent left anterior uveitis: Acute onset per ophthalmologist.  HLA-B27 positive.  History of rheumatoid arthritis per patient report but no active synovitis on exam today.  The most common causes of anterior uveitis include idiopathic and HLA-B27 associated; with other causes including sarcoidosis, Behcet's, infectious, multiple sclerosis, etc.  Sudden onset unilateral anterior uveitis is suggestive of an HLA-B27 association.  Reportedly had uveitis since 2009; was tx'd with MTX successfully for many years but he stopped it because of associated fatigue and did well for about 3-4 years until the uveitis flared and steroid eye drops twice daily only partially controlled the left eye symptoms   No rheumatologic disease identified associated with the uveitis.  Leflunomide 20 mg daily was used with benefit, but resulted in ALT elevation so the dose was reduced to 20 mg every other day and then 10mg daily with good control.  Because of more active arthritis will add SSZ today.   - Start sulfasalazine 500mg BID x7days, then 1000mg BID thereafter  - Continue leflunomide 10mg daily   - Labs monthly [id number]: CBC, Cr, Hepatic Panel  - Labs in 3 months: CBC, Creatinine, Hepatic Panel, ESR, CRP    # Sulfasalazine Risks and Benefits: The risks and benefits of sulfasalazine were discussed in detail and the patient verbalized understanding.  The risks discussed include, but are not limited to, the risk for hypersensitivity, anaphylaxis, anaphylactoid reactions, infections, bone marrow suppression,  hepatotoxicity, nausea,  vomiting, and GI upset.  Oligospermia may occur in males.  I encouraged reviewing the package insert and asking any questions about the medication.       2. Hx of rheumatoid arthritis: active arthritis on leflunomide 10mg daily; so sulfasalazine was added as noted above in #1.     3. Chewing tobacco: Encouraged complete cessation and discussed the health benefits.      4.  Vaccinations: Vaccinations reviewed with Mr. Bingham.  Risks and benefits of vaccinations were discussed.    - Influenza: encouraged yearly vaccination  - Dlposnr16: up to date  - Kiljiyclk47: will receive today  - Shingrix: up to date per patient    Mr. Bingham verbalized agreement with and understanding of the rational for the diagnosis and treatment plan.  All questions were answered to best of my ability and the patient's satisfaction. Mr. Bingham was advised to contact the clinic with any questions that may arise after the clinic visit.      Thank you for involving me in the care of the patient    Return to clinic: 3-4 monts      [facility]   Bry Bingham is a 49 year old male with a past medical history significant for iritis and rheumatoid arthritis who is seen for follow-up of recurrent left acute anterior uveitis.    Today, Mr. Bingham reports that his eyes are doing great.  More hand pain that is associated with swelling at the MCPs, increased morning stiffness to about 1 hour. Hand pain is improved with activity and worse with inactivity.     Denies fevers, chills, nausea, vomiting, constipation, diarrhea. No abdominal pain. No chest pain/pressure, palpitations, or shortness of breath. No LE swelling. No neck pain.  No lower back pain or lower back stiffness.  No oral or nasal sores.  No rash. No sicca symptoms.  No Raynaud's phenomenon symptoms.     Tobacco: Chewing tobacco  EtOH: 1-2 drinks every 2 weeks  Drugs: None  Occupation:  for [employer]   GEN: No fevers, chills, night sweats, or weight change  SKIN: No itching, rashes,  sores  HEENT:  No oral or nasal ulcers.  See HPI  CV: No chest pain, pressure, palpitations, or dyspnea on exertion.  PULM: No SOB, wheeze, cough.  GI: No nausea, vomiting, constipation, diarrhea. No blood in stool. No abdominal pain.  : No blood in urine.  MSK: See HPI.  NEURO: No numbness, tingling, or weakness.  EXT: No LE swelling  PSYCH: Negative    Active Problem List     Patient Active Problem List   Diagnosis     HLA-B27 associated acute anterior uveitis     Past Medical History   History reviewed. No pertinent past medical history.  Past Surgical History   History reviewed. No pertinent surgical history.  Allergy   No Known Allergies  Current Medication List     Current Outpatient Medications   Medication Sig     leflunomide (ARAVA) 10 MG tablet Take 1 tablet (10 mg) by mouth daily     No current facility-administered medications for this visit.          Social History   See HPI    Family History     Denies family history of rheumatologic disease    Physical Exam     Temp Readings from Last 3 Encounters:   12/17/18 98  F (36.7  C) (Oral)     BP Readings from Last 5 Encounters:   05/10/19 136/88   12/17/18 (!) 156/98   08/06/18 (!) 144/91     Pulse Readings from Last 1 Encounters:   05/10/19 77     Resp Readings from Last 1 Encounters:   08/06/18 16     There is no height or weight on file to calculate BMI.    GEN: NAD  HEENT: MMM. No oral lesions.  Anicteric noninjected sclera bilaterally.  CV: S1, S2. RRR. No m/r/g.  PULM: CTA bilaterally. No w/c.  MSK: Subtle synovial swelling, and tender to palpation at the bilateral 2nd-3rd MCPs.  Other MCPs, PIPs, wrists, elbows, shoulders, knees, and ankles without swelling or tenderness to palpation.  Negative MCP and MTP squeeze.   Hips nontender to palpation.   NEURO: UE and LE strengths 5/5 and equal bilaterally.   SKIN: No rash.  No nail pitting.  EXT: No LE edema  PSYCH: Alert. Appropriate.    Labs / Imaging (select studies)   [facility] labs on 12/3/2012:  HLA-B27 positive    RF/CCP  Recent Labs   Lab Test 08/06/18  0929   CCPIGG 1   RHF <20     CBC  Recent Labs   Lab Test 05/03/19  1323 02/04/19  1052 01/04/19  1319   WBC 6.4 6.7 7.1   RBC 5.51 5.32 5.18   HGB 15.6 14.7 14.3   HCT 46.5 45.1 43.2   MCV 84 85 83   RDW 13.5 13.7 13.5    319 295   MCH 28.3 27.6 27.6   MCHC 33.5 32.6 33.1   NEUTROPHIL 61.9 66.2 59.6   LYMPH 18.9 14.2 18.5   MONOCYTE 15.3 15.9 17.8   EOSINOPHIL 3.1 2.5 3.4   BASOPHIL 0.8 1.2 0.7   ANEU 4.0 4.4 4.2   ALYM 1.2 1.0 1.3   AURORA 1.0 1.1 1.3   AEOS 0.2 0.2 0.2   ABAS 0.1 0.1 0.1     CMP  Recent Labs   Lab Test 05/03/19  1323 02/04/19  1052 01/04/19  1319  08/06/18  0929   NA  --   --   --   --  137   POTASSIUM  --   --   --   --  4.7   CHLORIDE  --   --   --   --  104   CO2  --   --   --   --  25   ANIONGAP  --   --   --   --  8   GLC  --   --   --   --  84   BUN  --   --   --   --  14   CR 1.12 0.82 1.04   < > 0.82   GFRESTIMATED 76 >90 83   < > >90   GFRESTBLACK 88 >90 >90   < > >90   NICOLA  --   --   --   --  8.8   BILITOTAL 0.6 0.6 1.0   < > 0.5   ALBUMIN 3.6 3.6 3.6   < > 3.8   PROTTOTAL 7.1 7.3 7.1   < > 7.7   ALKPHOS 57 72 69   < > 70   AST 21 31 29   < > 24   ALT 36 33 40   < > 41    < > = values in this interval not displayed.     Calcium/VitaminD  Recent Labs   Lab Test 08/06/18  0929   NICOLA 8.8     ESR/CRP  Recent Labs   Lab Test 08/06/18  0929   SED 8   CRP 6.4     Hepatitis B  Recent Labs   Lab Test 08/06/18  0929   HBCAB Nonreactive   HEPBANG Nonreactive     Hepatitis C  Recent Labs   Lab Test 08/06/18  0929   HCVAB Nonreactive     Lyme ab screening  Recent Labs   Lab Test 08/06/18  0929   LYMEGM 0.06     Tuberculosis Screening  Recent Labs   Lab Test 08/06/18  0929   TBRES Negative     Immunization History     Immunization History   Administered Date(s) Administered     Pneumo Conj 13-V (2010&after) 12/17/2018     Pneumococcal 23 valent 05/10/2019          Chart documentation done in part with Dragon Voice recognition Software.  Although reviewed after completion, some word and grammatical error may remain.    Charles Bunn MD

## 2019-06-07 DIAGNOSIS — H20.00 HLA-B27 ASSOCIATED ACUTE ANTERIOR UVEITIS: ICD-10-CM

## 2019-06-07 LAB
ALBUMIN SERPL-MCNC: 3.6 G/DL (ref 3.4–5)
ALP SERPL-CCNC: 61 U/L (ref 40–150)
ALT SERPL W P-5'-P-CCNC: 39 U/L (ref 0–70)
AST SERPL W P-5'-P-CCNC: 25 U/L (ref 0–45)
BASOPHILS # BLD AUTO: 0.1 10E9/L (ref 0–0.2)
BASOPHILS NFR BLD AUTO: 1 %
BILIRUB DIRECT SERPL-MCNC: 0.2 MG/DL (ref 0–0.2)
BILIRUB SERPL-MCNC: 0.7 MG/DL (ref 0.2–1.3)
CREAT SERPL-MCNC: 0.85 MG/DL (ref 0.66–1.25)
DIFFERENTIAL METHOD BLD: ABNORMAL
EOSINOPHIL # BLD AUTO: 0.1 10E9/L (ref 0–0.7)
EOSINOPHIL NFR BLD AUTO: 1 %
ERYTHROCYTE [DISTWIDTH] IN BLOOD BY AUTOMATED COUNT: 14.2 % (ref 10–15)
GFR SERPL CREATININE-BSD FRML MDRD: >90 ML/MIN/{1.73_M2}
HCT VFR BLD AUTO: 44.4 % (ref 40–53)
HGB BLD-MCNC: 15.1 G/DL (ref 13.3–17.7)
LYMPHOCYTES # BLD AUTO: 0.7 10E9/L (ref 0.8–5.3)
LYMPHOCYTES NFR BLD AUTO: 15 %
MCH RBC QN AUTO: 28.1 PG (ref 26.5–33)
MCHC RBC AUTO-ENTMCNC: 34 G/DL (ref 31.5–36.5)
MCV RBC AUTO: 83 FL (ref 78–100)
MONOCYTES # BLD AUTO: 1.4 10E9/L (ref 0–1.3)
MONOCYTES NFR BLD AUTO: 28 %
MYELOCYTES # BLD: 0.1 10E9/L
MYELOCYTES NFR BLD MANUAL: 1 %
NEUTROPHILS # BLD AUTO: 2.5 10E9/L (ref 1.6–8.3)
NEUTROPHILS NFR BLD AUTO: 54 %
PLATELET # BLD AUTO: 319 10E9/L (ref 150–450)
PLATELET # BLD EST: ABNORMAL 10*3/UL
PROT SERPL-MCNC: 7.2 G/DL (ref 6.8–8.8)
RBC # BLD AUTO: 5.38 10E12/L (ref 4.4–5.9)
WBC # BLD AUTO: 4.9 10E9/L (ref 4–11)

## 2019-06-07 PROCEDURE — 80076 HEPATIC FUNCTION PANEL: CPT | Performed by: INTERNAL MEDICINE

## 2019-06-07 PROCEDURE — 82565 ASSAY OF CREATININE: CPT | Performed by: INTERNAL MEDICINE

## 2019-06-07 PROCEDURE — 36415 COLL VENOUS BLD VENIPUNCTURE: CPT | Performed by: INTERNAL MEDICINE

## 2019-06-07 PROCEDURE — 85025 COMPLETE CBC W/AUTO DIFF WBC: CPT | Performed by: INTERNAL MEDICINE

## 2019-07-12 DIAGNOSIS — H20.00 HLA-B27 ASSOCIATED ACUTE ANTERIOR UVEITIS: ICD-10-CM

## 2019-07-12 LAB
ALBUMIN SERPL-MCNC: 3.7 G/DL (ref 3.4–5)
ALP SERPL-CCNC: 48 U/L (ref 40–150)
ALT SERPL W P-5'-P-CCNC: 26 U/L (ref 0–70)
AST SERPL W P-5'-P-CCNC: 23 U/L (ref 0–45)
BASOPHILS # BLD AUTO: 0 10E9/L (ref 0–0.2)
BASOPHILS NFR BLD AUTO: 0.7 %
BILIRUB DIRECT SERPL-MCNC: 0.2 MG/DL (ref 0–0.2)
BILIRUB SERPL-MCNC: 1.2 MG/DL (ref 0.2–1.3)
CREAT SERPL-MCNC: 1.01 MG/DL (ref 0.66–1.25)
CRP SERPL-MCNC: <2.9 MG/L (ref 0–8)
DIFFERENTIAL METHOD BLD: NORMAL
EOSINOPHIL # BLD AUTO: 0 10E9/L (ref 0–0.7)
EOSINOPHIL NFR BLD AUTO: 0.2 %
ERYTHROCYTE [DISTWIDTH] IN BLOOD BY AUTOMATED COUNT: 13.6 % (ref 10–15)
ERYTHROCYTE [SEDIMENTATION RATE] IN BLOOD BY WESTERGREN METHOD: 4 MM/H (ref 0–20)
GFR SERPL CREATININE-BSD FRML MDRD: 86 ML/MIN/{1.73_M2}
HCT VFR BLD AUTO: 42.7 % (ref 40–53)
HGB BLD-MCNC: 14.7 G/DL (ref 13.3–17.7)
LYMPHOCYTES # BLD AUTO: 0.8 10E9/L (ref 0.8–5.3)
LYMPHOCYTES NFR BLD AUTO: 19.2 %
MCH RBC QN AUTO: 28.8 PG (ref 26.5–33)
MCHC RBC AUTO-ENTMCNC: 34.4 G/DL (ref 31.5–36.5)
MCV RBC AUTO: 84 FL (ref 78–100)
MONOCYTES # BLD AUTO: 0.9 10E9/L (ref 0–1.3)
MONOCYTES NFR BLD AUTO: 19.9 %
NEUTROPHILS # BLD AUTO: 2.6 10E9/L (ref 1.6–8.3)
NEUTROPHILS NFR BLD AUTO: 60 %
PLATELET # BLD AUTO: 246 10E9/L (ref 150–450)
PROT SERPL-MCNC: 7.1 G/DL (ref 6.8–8.8)
RBC # BLD AUTO: 5.11 10E12/L (ref 4.4–5.9)
WBC # BLD AUTO: 4.3 10E9/L (ref 4–11)

## 2019-07-12 PROCEDURE — 85652 RBC SED RATE AUTOMATED: CPT | Performed by: INTERNAL MEDICINE

## 2019-07-12 PROCEDURE — 86140 C-REACTIVE PROTEIN: CPT | Performed by: INTERNAL MEDICINE

## 2019-07-12 PROCEDURE — 85025 COMPLETE CBC W/AUTO DIFF WBC: CPT | Performed by: INTERNAL MEDICINE

## 2019-07-12 PROCEDURE — 36415 COLL VENOUS BLD VENIPUNCTURE: CPT | Performed by: INTERNAL MEDICINE

## 2019-07-12 PROCEDURE — 82565 ASSAY OF CREATININE: CPT | Performed by: INTERNAL MEDICINE

## 2019-07-12 PROCEDURE — 80076 HEPATIC FUNCTION PANEL: CPT | Performed by: INTERNAL MEDICINE

## 2019-08-02 ENCOUNTER — OFFICE VISIT (OUTPATIENT)
Dept: RHEUMATOLOGY | Facility: CLINIC | Age: 49
End: 2019-08-02
Payer: COMMERCIAL

## 2019-08-02 VITALS
DIASTOLIC BLOOD PRESSURE: 80 MMHG | OXYGEN SATURATION: 99 % | WEIGHT: 182 LBS | HEART RATE: 77 BPM | SYSTOLIC BLOOD PRESSURE: 134 MMHG

## 2019-08-02 DIAGNOSIS — Z79.899 HIGH RISK MEDICATIONS (NOT ANTICOAGULANTS) LONG-TERM USE: ICD-10-CM

## 2019-08-02 DIAGNOSIS — M06.4 INFLAMMATORY POLYARTHROPATHY (H): ICD-10-CM

## 2019-08-02 DIAGNOSIS — H20.00 HLA-B27 ASSOCIATED ACUTE ANTERIOR UVEITIS: Primary | ICD-10-CM

## 2019-08-02 PROCEDURE — 99213 OFFICE O/P EST LOW 20 MIN: CPT | Performed by: INTERNAL MEDICINE

## 2019-08-02 RX ORDER — SULFASALAZINE 500 MG/1
1000 TABLET, DELAYED RELEASE ORAL 2 TIMES DAILY
Qty: 360 TABLET | Refills: 1 | Status: SHIPPED | OUTPATIENT
Start: 2019-08-02 | End: 2019-11-22

## 2019-08-02 NOTE — PATIENT INSTRUCTIONS
Rheumatology    Dr. Charles Bunn         [facility]   (Monday)  [address]  [place] [address]       [facility]   (Tuesday)  [address]  [place] [address]    [facility]   (Wed., Thurs., and Friday)  [address] [place] [address]    Phone number: [phone]  Thank you for choosing [facility].  Theresa Clay CMA

## 2019-08-02 NOTE — NURSING NOTE
RAPID3 (0-30) Cumulative Score  0          RAPID3 Weighted Score (divide #4 by 3 and that is the weighted score)  0       Theresa Clay CMA Rheumatology  8/2/2019 10:01 AM      Dr. Luis Eduardo Young from [facility]  Theresa Clay CMA Rheumatology  8/5/2019 8:01 AM

## 2019-08-02 NOTE — PROGRESS NOTES
Rheumatology Clinic Visit      Bry Bingham MRN# [id number]   YOB: 1970 Age: 49 year old      Date of visit: 8/02/19   Ophthalmologist: Dr. Luis Eduardo Young from [facility]   PCP: Hood Melo at [facility]    Chief Complaint   Patient presents with:  HLA-B27 associated acute anterior uveitis: patient is doing good    Assessment and Plan     1.  HLA-B27 associated recurrent left anterior uveitis: Acute onset per ophthalmologist.  HLA-B27 positive.  History of rheumatoid arthritis per patient report but no active synovitis seen on exam.  The most common causes of anterior uveitis include idiopathic and HLA-B27 associated; with other causes including sarcoidosis, Behcet's, infectious, multiple sclerosis, etc.  Sudden onset unilateral anterior uveitis is suggestive of an HLA-B27 association.  Reportedly had uveitis since 2009; was tx'd with MTX successfully for many years but he stopped it because of associated fatigue and did well for about 3-4 years until the uveitis flared and steroid eye drops twice daily only partially controlled the left eye symptoms   initially without a rheumatologic disease identified to associate with uveitis.  Leflunomide 20 mg daily was started with good benefit but liver enzymes elevated so it was reduced to leflunomide 10 mg daily.  He then presented with symmetric synovitis of the MCPs, showing return of active inflammatory arthritis so sulfasalazine was added with resolution of his arthritis.  At this point he is on leflunomide 10 mg daily and sulfasalazine 1000 mg twice daily with good control of arthritis and no recurrence of uveitis.   - Continue sulfasalazine 1000mg BID   - Continue leflunomide 10mg daily   - Labs every 3 months: CBC, Creatinine, Hepatic Panel, ESR, CRP    2. 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 See #1.  Doing well today.    3. Chewing tobacco: Encouraged complete cessation and discussed the health benefits.       4.  Vaccinations: Vaccinations reviewed with Mr. Bingham.  Risks and benefits of vaccinations were discussed.    - Influenza: encouraged yearly vaccination  - [id number]: up to date  - Njnhdintu39: up to date  - Shingrix: up to date per patient    Mr. Bingham verbalized agreement with and understanding of the rational for the diagnosis and treatment plan.  All questions were answered to best of my ability and the patient's satisfaction. Mr. Bingham was advised to contact the clinic with any questions that may arise after the clinic visit.      Thank you for involving me in the care of the patient    Return to clinic: 3-4 monts      HPI   Bry Bingham is a 49 year old male with a past medical history significant for iritis and rheumatoid arthritis who is seen for follow-up of recurrent left acute anterior uveitis.    Today, Mr. Bingham reports that he is doing great.  No joint pain.  No morning stiffness.  No recurrence of uveitis.  Tolerating sulfasalazine leflunomide well.  No concerns.     Denies fevers, chills, nausea, vomiting, constipation, diarrhea. No abdominal pain. No chest pain/pressure, palpitations, or shortness of breath. No LE swelling. No neck pain.  No lower back pain or lower back stiffness.  No oral or nasal sores.  No rash. No sicca symptoms.  No Raynaud's phenomenon symptoms.     Tobacco: Chewing tobacco  EtOH: 1-2 drinks every 2 weeks  Drugs: None  Occupation:  for [employer]   GEN: No fevers, chills, night sweats, or weight change  SKIN: No itching, rashes, sores  HEENT:  No oral or nasal ulcers.  See HPI  CV: No chest pain, pressure, palpitations, or dyspnea on exertion.  PULM: No SOB, wheeze, cough.  GI: No nausea, vomiting, constipation, diarrhea. No blood in stool. No abdominal pain.  : No blood in urine.  MSK: See HPI.  NEURO: No numbness, tingling, or weakness.  EXT: No LE swelling  PSYCH: Negative    Active Problem List     Patient Active Problem List   Diagnosis      HLA-B27 associated acute anterior uveitis     Past Medical History   History reviewed. No pertinent past medical history.  Past Surgical History   History reviewed. No pertinent surgical history.  Allergy   No Known Allergies  Current Medication List     Current Outpatient Medications   Medication Sig     leflunomide (ARAVA) 10 MG tablet Take 1 tablet (10 mg) by mouth daily     sulfaSALAzine ER (AZULFIDINE EN) 500 MG EC tablet 500mg BID for 7 days, then increase to 1000mg BID and continue 1000mg BID thereafter.     No current facility-administered medications for this visit.          Social History   See HPI    Family History     Denies family history of rheumatologic disease    Physical Exam     Temp Readings from Last 3 Encounters:   12/17/18 98  F (36.7  C) (Oral)     BP Readings from Last 5 Encounters:   08/02/19 134/80   05/10/19 136/88   12/17/18 (!) 156/98   08/06/18 (!) 144/91     Pulse Readings from Last 1 Encounters:   08/02/19 77     Resp Readings from Last 1 Encounters:   08/06/18 16     There is no height or weight on file to calculate BMI.    GEN: NAD  HEENT: MMM. No oral lesions.  Anicteric noninjected sclera bilaterally.  CV: S1, S2. RRR. No m/r/g.  PULM: CTA bilaterally. No w/c.  MSK:  MCPs, PIPs, wrists, elbows, shoulders, knees, ankles, and MTPs without swelling or tenderness to palpation.  Negative MCP and MTP squeeze.   Hips nontender to palpation.  Achilles tendons nontender to palpation.  NEURO: UE and LE strengths 5/5 and equal bilaterally.   SKIN: No rash.  No nail pitting.  EXT: No LE edema  PSYCH: Alert. Appropriate.    Labs / Imaging (select studies)   [facility] labs on 12/3/2012: HLA-B27 positive    RF/CCP  Recent Labs   Lab Test 08/06/18  0929   CCPIGG 1   RHF <20     CBC  Recent Labs   Lab Test 07/12/19  0946 06/07/19  0952 05/03/19  1323   WBC 4.3 4.9 6.4   RBC 5.11 5.38 5.51   HGB 14.7 15.1 15.6   HCT 42.7 44.4 46.5   MCV 84 83 84   RDW 13.6 14.2 13.5    319 282   MCH  28.8 28.1 28.3   MCHC 34.4 34.0 33.5   NEUTROPHIL 60.0 54.0 61.9   LYMPH 19.2 15.0 18.9   MONOCYTE 19.9 28.0 15.3   EOSINOPHIL 0.2 1.0 3.1   BASOPHIL 0.7 1.0 0.8   ANEU 2.6 2.5 4.0   ALYM 0.8 0.7* 1.2   AURORA 0.9 1.4* 1.0   AEOS 0.0 0.1 0.2   ABAS 0.0 0.1 0.1     CMP  Recent Labs   Lab Test 07/12/19  0946 06/07/19  0952 05/03/19  1323  08/06/18  0929   NA  --   --   --   --  137   POTASSIUM  --   --   --   --  4.7   CHLORIDE  --   --   --   --  104   CO2  --   --   --   --  25   ANIONGAP  --   --   --   --  8   GLC  --   --   --   --  84   BUN  --   --   --   --  14   CR 1.01 0.85 1.12   < > 0.82   GFRESTIMATED 86 >90 76   < > >90   GFRESTBLACK >90 >90 88   < > >90   NICOLA  --   --   --   --  8.8   BILITOTAL 1.2 0.7 0.6   < > 0.5   ALBUMIN 3.7 3.6 3.6   < > 3.8   PROTTOTAL 7.1 7.2 7.1   < > 7.7   ALKPHOS 48 61 57   < > 70   AST 23 25 21   < > 24   ALT 26 39 36   < > 41    < > = values in this interval not displayed.     Calcium/VitaminD  Recent Labs   Lab Test 08/06/18  0929   NICOLA 8.8     ESR/CRP  Recent Labs   Lab Test 07/12/19  0946 08/06/18  0929   SED 4 8   CRP <2.9 6.4     Hepatitis B  Recent Labs   Lab Test 08/06/18  0929   HBCAB Nonreactive   HEPBANG Nonreactive     Hepatitis C  Recent Labs   Lab Test 08/06/18  0929   HCVAB Nonreactive     Lyme ab screening  Recent Labs   Lab Test 08/06/18  0929   LYMEGM 0.06     Tuberculosis Screening  Recent Labs   Lab Test 08/06/18  0929   TBRES Negative     Immunization History     Immunization History   Administered Date(s) Administered     Pneumo Conj 13-V (2010&after) 12/17/2018     Pneumococcal 23 valent 05/10/2019          Chart documentation done in part with Dragon Voice recognition Software. Although reviewed after completion, some word and grammatical error may remain.    Charles Bunn MD

## 2019-08-02 NOTE — Clinical Note
Please fax my clinic note dated 8/2/2019 to Mr. Bingham's eye doctor[facility] you,Charles Bunn MD8/2/2019 12:11 PM

## 2019-08-09 DIAGNOSIS — H20.00 HLA-B27 ASSOCIATED ACUTE ANTERIOR UVEITIS: ICD-10-CM

## 2019-08-09 LAB
ALBUMIN SERPL-MCNC: 3.8 G/DL (ref 3.4–5)
ALP SERPL-CCNC: 46 U/L (ref 40–150)
ALT SERPL W P-5'-P-CCNC: 37 U/L (ref 0–70)
AST SERPL W P-5'-P-CCNC: 26 U/L (ref 0–45)
BASOPHILS # BLD AUTO: 0 10E9/L (ref 0–0.2)
BASOPHILS NFR BLD AUTO: 1 %
BILIRUB DIRECT SERPL-MCNC: 0.1 MG/DL (ref 0–0.2)
BILIRUB SERPL-MCNC: 0.5 MG/DL (ref 0.2–1.3)
CREAT SERPL-MCNC: 0.94 MG/DL (ref 0.66–1.25)
DIFFERENTIAL METHOD BLD: NORMAL
EOSINOPHIL # BLD AUTO: 0.2 10E9/L (ref 0–0.7)
EOSINOPHIL NFR BLD AUTO: 4 %
ERYTHROCYTE [DISTWIDTH] IN BLOOD BY AUTOMATED COUNT: 14.2 % (ref 10–15)
GFR SERPL CREATININE-BSD FRML MDRD: >90 ML/MIN/{1.73_M2}
HCT VFR BLD AUTO: 45.2 % (ref 40–53)
HGB BLD-MCNC: 15.3 G/DL (ref 13.3–17.7)
LYMPHOCYTES # BLD AUTO: 1 10E9/L (ref 0.8–5.3)
LYMPHOCYTES NFR BLD AUTO: 20 %
MCH RBC QN AUTO: 29.4 PG (ref 26.5–33)
MCHC RBC AUTO-ENTMCNC: 33.8 G/DL (ref 31.5–36.5)
MCV RBC AUTO: 87 FL (ref 78–100)
MONOCYTES # BLD AUTO: 1.1 10E9/L (ref 0–1.3)
MONOCYTES NFR BLD AUTO: 22 %
NEUTROPHILS # BLD AUTO: 2.5 10E9/L (ref 1.6–8.3)
NEUTROPHILS NFR BLD AUTO: 53 %
PLATELET # BLD AUTO: 246 10E9/L (ref 150–450)
PROT SERPL-MCNC: 7.3 G/DL (ref 6.8–8.8)
RBC # BLD AUTO: 5.21 10E12/L (ref 4.4–5.9)
WBC # BLD AUTO: 4.8 10E9/L (ref 4–11)

## 2019-08-09 PROCEDURE — 82565 ASSAY OF CREATININE: CPT | Performed by: INTERNAL MEDICINE

## 2019-08-09 PROCEDURE — 80076 HEPATIC FUNCTION PANEL: CPT | Performed by: INTERNAL MEDICINE

## 2019-08-09 PROCEDURE — 36415 COLL VENOUS BLD VENIPUNCTURE: CPT | Performed by: INTERNAL MEDICINE

## 2019-08-09 PROCEDURE — 85025 COMPLETE CBC W/AUTO DIFF WBC: CPT | Performed by: INTERNAL MEDICINE

## 2019-10-11 DIAGNOSIS — H20.00 HLA-B27 ASSOCIATED ACUTE ANTERIOR UVEITIS: ICD-10-CM

## 2019-10-11 LAB
ALBUMIN SERPL-MCNC: 4 G/DL (ref 3.4–5)
ALP SERPL-CCNC: 44 U/L (ref 40–150)
ALT SERPL W P-5'-P-CCNC: 35 U/L (ref 0–70)
AST SERPL W P-5'-P-CCNC: 26 U/L (ref 0–45)
BASOPHILS # BLD AUTO: 0 10E9/L (ref 0–0.2)
BASOPHILS NFR BLD AUTO: 1 %
BILIRUB DIRECT SERPL-MCNC: 0.3 MG/DL (ref 0–0.2)
BILIRUB SERPL-MCNC: 1.1 MG/DL (ref 0.2–1.3)
CREAT SERPL-MCNC: 0.85 MG/DL (ref 0.66–1.25)
CRP SERPL-MCNC: <2.9 MG/L (ref 0–8)
DIFFERENTIAL METHOD BLD: ABNORMAL
EOSINOPHIL # BLD AUTO: 0.3 10E9/L (ref 0–0.7)
EOSINOPHIL NFR BLD AUTO: 6 %
ERYTHROCYTE [DISTWIDTH] IN BLOOD BY AUTOMATED COUNT: 13.1 % (ref 10–15)
ERYTHROCYTE [SEDIMENTATION RATE] IN BLOOD BY WESTERGREN METHOD: 4 MM/H (ref 0–20)
GFR SERPL CREATININE-BSD FRML MDRD: >90 ML/MIN/{1.73_M2}
HCT VFR BLD AUTO: 46.6 % (ref 40–53)
HGB BLD-MCNC: 16 G/DL (ref 13.3–17.7)
LYMPHOCYTES # BLD AUTO: 1.4 10E9/L (ref 0.8–5.3)
LYMPHOCYTES NFR BLD AUTO: 30 %
MCH RBC QN AUTO: 29.6 PG (ref 26.5–33)
MCHC RBC AUTO-ENTMCNC: 34.3 G/DL (ref 31.5–36.5)
MCV RBC AUTO: 86 FL (ref 78–100)
MONOCYTES # BLD AUTO: 0.8 10E9/L (ref 0–1.3)
MONOCYTES NFR BLD AUTO: 18 %
NEUTROPHILS # BLD AUTO: 2 10E9/L (ref 1.6–8.3)
NEUTROPHILS NFR BLD AUTO: 45 %
NRBC # BLD AUTO: 0 10*3/UL
NRBC BLD AUTO-RTO: 1 /100
PLATELET # BLD AUTO: 257 10E9/L (ref 150–450)
PLATELET # BLD EST: ABNORMAL 10*3/UL
PROT SERPL-MCNC: 7.6 G/DL (ref 6.8–8.8)
RBC # BLD AUTO: 5.41 10E12/L (ref 4.4–5.9)
WBC # BLD AUTO: 4.5 10E9/L (ref 4–11)

## 2019-10-11 PROCEDURE — 80076 HEPATIC FUNCTION PANEL: CPT | Performed by: INTERNAL MEDICINE

## 2019-10-11 PROCEDURE — 36415 COLL VENOUS BLD VENIPUNCTURE: CPT | Performed by: INTERNAL MEDICINE

## 2019-10-11 PROCEDURE — 85652 RBC SED RATE AUTOMATED: CPT | Performed by: INTERNAL MEDICINE

## 2019-10-11 PROCEDURE — 85025 COMPLETE CBC W/AUTO DIFF WBC: CPT | Performed by: INTERNAL MEDICINE

## 2019-10-11 PROCEDURE — 82565 ASSAY OF CREATININE: CPT | Performed by: INTERNAL MEDICINE

## 2019-10-11 PROCEDURE — 86140 C-REACTIVE PROTEIN: CPT | Performed by: INTERNAL MEDICINE

## 2019-11-04 ENCOUNTER — HEALTH MAINTENANCE LETTER (OUTPATIENT)
Age: 49
End: 2019-11-04

## 2019-11-22 ENCOUNTER — OFFICE VISIT (OUTPATIENT)
Dept: RHEUMATOLOGY | Facility: CLINIC | Age: 49
End: 2019-11-22
Payer: COMMERCIAL

## 2019-11-22 VITALS
HEART RATE: 89 BPM | SYSTOLIC BLOOD PRESSURE: 130 MMHG | OXYGEN SATURATION: 97 % | WEIGHT: 188.6 LBS | DIASTOLIC BLOOD PRESSURE: 88 MMHG

## 2019-11-22 DIAGNOSIS — H20.00 HLA-B27 ASSOCIATED ACUTE ANTERIOR UVEITIS: Primary | ICD-10-CM

## 2019-11-22 DIAGNOSIS — Z79.899 HIGH RISK MEDICATIONS (NOT ANTICOAGULANTS) LONG-TERM USE: ICD-10-CM

## 2019-11-22 PROCEDURE — 99213 OFFICE O/P EST LOW 20 MIN: CPT | Performed by: INTERNAL MEDICINE

## 2019-11-22 RX ORDER — LEFLUNOMIDE 10 MG/1
10 TABLET ORAL DAILY
Qty: 90 TABLET | Refills: 2 | Status: SHIPPED | OUTPATIENT
Start: 2019-11-22 | End: 2020-04-24

## 2019-11-22 RX ORDER — SULFASALAZINE 500 MG/1
1000 TABLET, DELAYED RELEASE ORAL 2 TIMES DAILY
Qty: 360 TABLET | Refills: 2 | Status: SHIPPED | OUTPATIENT
Start: 2019-11-22 | End: 2020-04-24

## 2019-11-22 NOTE — PROGRESS NOTES
Rheumatology Clinic Visit      Bry Bingham MRN# [id number]   YOB: 1970 Age: 49 year old      Date of visit: 11/22/19   Ophthalmologist: Dr. Luis Eduardo Young from [facility]   PCP: Hood Melo at [facility]    Chief Complaint   Patient presents with:  RECHECK: HLA-B27 associated recurrent left anterior uveitis.    Assessment and Plan     1.  HLA-B27 associated recurrent left anterior uveitis: Acute onset per ophthalmologist.  HLA-B27 positive.  History of rheumatoid arthritis per patient report but no active synovitis seen on admission exam.  The most common causes of anterior uveitis include idiopathic and HLA-B27 associated; with other causes including sarcoidosis, Behcet's, infectious, multiple sclerosis, etc.  Sudden onset unilateral anterior uveitis was suggestive of an HLA-B27 association.  Reportedly had uveitis since 2009; was tx'd with MTX successfully for many years but he stopped it because of associated fatigue and did well for about 3-4 years until the uveitis flared and steroid eye drops twice daily only partially controlled the left eye symptoms.   Initially without a rheumatologic disease identified to associate with uveitis.  Leflunomide 20 mg daily was started with good benefit but liver enzymes elevated so it was reduced to leflunomide 10 mg daily.  He then presented with symmetric synovitis of the MCPs, so sulfasalazine was added with resolution of his inflammatory arthritis.  At this point he is on leflunomide 10 mg daily and sulfasalazine 1000 mg twice daily with good control of arthritis and no recurrence of uveitis.   - Continue sulfasalazine 1000mg BID   - Continue leflunomide 10mg daily   - Labs in early January: CBC, Creatinine, Hepatic Panel  - Labs in early April: CBC, Creatinine, Hepatic Panel, ESR, CRP     2. 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 See #1.  Doing well today.    3. Chewing tobacco: Encouraged complete cessation and  discussed the health benefits.      4.  Vaccinations: Vaccinations reviewed with Mr. Bingham.    - Influenza: encouraged yearly vaccination; up to date for the 2019/2020 season  - [id number]: up to date  - [id number]: up to date  - Shingrix: up to date per patient; [facility] documents having received on 11/9/2018 and 2/14/2019    5. Elevated blood pressure:  Bry to follow up with Primary Care provider regarding elevated blood pressure.     Mr. Bingham verbalized agreement with and understanding of the rational for the diagnosis and treatment plan.  All questions were answered to best of my ability and the patient's satisfaction. Mr. Bingham was advised to contact the clinic with any questions that may arise after the clinic visit.      Thank you for involving me in the care of the patient    Return to clinic: April 2020    HPI   Bry Bingham is a 49 year old male with a past medical history significant for iritis and rheumatoid arthritis who is seen for follow-up of recurrent left acute anterior uveitis.    Today, Mr. Bingham reports that he is doing great.  No joint pain.  No morning stiffness.  No recurrence of uveitis.  Tolerating sulfasalazine and leflunomide well.  No concerns.  Happy with how well he is doing    Denies fevers, chills, nausea, vomiting, constipation, diarrhea. No abdominal pain. No chest pain/pressure, palpitations, or shortness of breath. No LE swelling. No neck pain.  No lower back pain or lower back stiffness.  No oral or nasal sores.  No rash. No sicca symptoms.  No Raynaud's phenomenon symptoms.     Tobacco: Chewing tobacco  EtOH: 1-2 drinks every 2 weeks  Drugs: None  Occupation:  for [employer]   GEN: No fevers, chills, night sweats, or weight change  SKIN: No itching, rashes, sores  HEENT:  No oral or nasal ulcers.  See HPI  CV: No chest pain, pressure, palpitations, or dyspnea on exertion.  PULM: No SOB, wheeze, cough.  GI: No nausea, vomiting, constipation, diarrhea. No blood  in stool. No abdominal pain.  : No blood in urine.  MSK: See HPI.  NEURO: No numbness, tingling, or weakness.  EXT: No LE swelling  PSYCH: Negative    Active Problem List     Patient Active Problem List   Diagnosis     HLA-B27 associated acute anterior uveitis     Past Medical History   No past medical history on file.  Past Surgical History   No past surgical history on file.  Allergy   No Known Allergies  Current Medication List     Current Outpatient Medications   Medication Sig     leflunomide (ARAVA) 10 MG tablet Take 1 tablet (10 mg) by mouth daily     sulfaSALAzine ER (AZULFIDINE EN) 500 MG EC tablet Take 2 tablets (1,000 mg) by mouth 2 times daily     No current facility-administered medications for this visit.          Social History   See HPI    Family History     Denies family history of rheumatologic disease    Physical Exam     Temp Readings from Last 3 Encounters:   12/17/18 98  F (36.7  C) (Oral)     BP Readings from Last 5 Encounters:   11/22/19 (!) 139/96   08/02/19 134/80   05/10/19 136/88   12/17/18 (!) 156/98   08/06/18 (!) 144/91     Pulse Readings from Last 1 Encounters:   08/02/19 77     Resp Readings from Last 1 Encounters:   08/06/18 16     There is no height or weight on file to calculate BMI.    GEN: NAD  HEENT: MMM. No oral lesions.  Anicteric noninjected sclera bilaterally.  CV: S1, S2. RRR. No m/r/g.  PULM: CTA bilaterally. No w/c.  MSK:  MCPs, PIPs, wrists, elbows, shoulders, knees, ankles, and MTPs without swelling or tenderness to palpation.  Negative MCP and MTP squeeze.   Hips nontender to palpation.  Achilles tendons nontender to palpation.  SKIN: No rash.  No nail pitting.  EXT: No LE edema   PSYCH: Alert. Appropriate.    Labs / Imaging (select studies)   [facility] labs on 12/3/2012: HLA-B27 positive    RF/CCP  Recent Labs   Lab Test 08/06/18  0929   CCPIGG 1   RHF <20     CBC  Recent Labs   Lab Test 10/11/19  0953 08/09/19  0950 07/12/19  0946   WBC 4.5 4.8 4.3   RBC 5.41  5.21 5.11   HGB 16.0 15.3 14.7   HCT 46.6 45.2 42.7   MCV 86 87 84   RDW 13.1 14.2 13.6    246 246   MCH 29.6 29.4 28.8   MCHC 34.3 33.8 34.4   NEUTROPHIL 45.0 53.0 60.0   LYMPH 30.0 20.0 19.2   MONOCYTE 18.0 22.0 19.9   EOSINOPHIL 6.0 4.0 0.2   BASOPHIL 1.0 1.0 0.7   ANEU 2.0 2.5 2.6   ALYM 1.4 1.0 0.8   AURORA 0.8 1.1 0.9   AEOS 0.3 0.2 0.0   ABAS 0.0 0.0 0.0     CMP  Recent Labs   Lab Test 10/11/19  0953 08/09/19  0950 07/12/19 0946 08/06/18  0929   NA  --   --   --   --  137   POTASSIUM  --   --   --   --  4.7   CHLORIDE  --   --   --   --  104   CO2  --   --   --   --  25   ANIONGAP  --   --   --   --  8   GLC  --   --   --   --  84   BUN  --   --   --   --  14   CR 0.85 0.94 1.01   < > 0.82   GFRESTIMATED >90 >90 86   < > >90   GFRESTBLACK >90 >90 >90   < > >90   NICOLA  --   --   --   --  8.8   BILITOTAL 1.1 0.5 1.2   < > 0.5   ALBUMIN 4.0 3.8 3.7   < > 3.8   PROTTOTAL 7.6 7.3 7.1   < > 7.7   ALKPHOS 44 46 48   < > 70   AST 26 26 23   < > 24   ALT 35 37 26   < > 41    < > = values in this interval not displayed.     Calcium/VitaminD  Recent Labs   Lab Test 08/06/18  0929   NICOLA 8.8     ESR/CRP  Recent Labs   Lab Test 10/11/19  0953 07/12/19 0946 08/06/18  0929   SED 4 4 8   CRP <2.9 <2.9 6.4     Hepatitis B  Recent Labs   Lab Test 08/06/18 0929   HBCAB Nonreactive   HEPBANG Nonreactive     Hepatitis C  Recent Labs   Lab Test 08/06/18  0929   HCVAB Nonreactive     Lyme ab screening  Recent Labs   Lab Test 08/06/18  0929   LYMEGM 0.06     Tuberculosis Screening  Recent Labs   Lab Test 08/06/18  0929   TBRES Negative     Immunization History     Immunization History   Administered Date(s) Administered     Pneumo Conj 13-V (2010&after) 12/17/2018     Pneumococcal 23 valent 05/10/2019          Chart documentation done in part with Dragon Voice recognition Software. Although reviewed after completion, some word and grammatical error may remain.    Charles Bunn MD

## 2019-11-22 NOTE — NURSING NOTE
Bry to follow up with Primary Care provider regarding elevated blood pressure.  Blood pressure rechecked after visit    130/88  Theresa Clay CMA Rheumatology  11/22/2019 10:29 AM                                RAPID3 (0-30) Cumulative Score  0          RAPID3 Weighted Score (divide #4 by 3 and that is the weighted score)  0     Theresa Clay CMA Rheumatology  11/22/2019 10:03 AM

## 2019-11-22 NOTE — PATIENT INSTRUCTIONS
Rheumatology    Dr. Charles Bunn       [facility] in [place]   (Monday)  [address]  [place] [address]       [facility] in [place]   (Tuesday)  [address] [place] [address]    [facility] in [place]   (Wed., Thurs., and Friday)  [address] [place] [address]    Phone number: [phone]  Thank you for choosing [facility].  Theresa Clay CMA

## 2020-01-23 DIAGNOSIS — H20.00 HLA-B27 ASSOCIATED ACUTE ANTERIOR UVEITIS: ICD-10-CM

## 2020-01-23 LAB
ALBUMIN SERPL-MCNC: 4.1 G/DL (ref 3.4–5)
ALP SERPL-CCNC: 53 U/L (ref 40–150)
ALT SERPL W P-5'-P-CCNC: 41 U/L (ref 0–70)
AST SERPL W P-5'-P-CCNC: 33 U/L (ref 0–45)
BASOPHILS # BLD AUTO: 0 10E9/L (ref 0–0.2)
BASOPHILS NFR BLD AUTO: 1 %
BILIRUB DIRECT SERPL-MCNC: 0.1 MG/DL (ref 0–0.2)
BILIRUB SERPL-MCNC: 0.5 MG/DL (ref 0.2–1.3)
CREAT SERPL-MCNC: 0.84 MG/DL (ref 0.66–1.25)
DIFFERENTIAL METHOD BLD: ABNORMAL
EOSINOPHIL # BLD AUTO: 0.3 10E9/L (ref 0–0.7)
EOSINOPHIL NFR BLD AUTO: 7 %
ERYTHROCYTE [DISTWIDTH] IN BLOOD BY AUTOMATED COUNT: 12.7 % (ref 10–15)
GFR SERPL CREATININE-BSD FRML MDRD: >90 ML/MIN/{1.73_M2}
HCT VFR BLD AUTO: 46 % (ref 40–53)
HGB BLD-MCNC: 15.4 G/DL (ref 13.3–17.7)
LYMPHOCYTES # BLD AUTO: 1 10E9/L (ref 0.8–5.3)
LYMPHOCYTES NFR BLD AUTO: 26 %
MCH RBC QN AUTO: 29.1 PG (ref 26.5–33)
MCHC RBC AUTO-ENTMCNC: 33.5 G/DL (ref 31.5–36.5)
MCV RBC AUTO: 87 FL (ref 78–100)
MONOCYTES # BLD AUTO: 0.7 10E9/L (ref 0–1.3)
MONOCYTES NFR BLD AUTO: 17 %
NEUTROPHILS # BLD AUTO: 1.9 10E9/L (ref 1.6–8.3)
NEUTROPHILS NFR BLD AUTO: 49 %
PLATELET # BLD AUTO: 251 10E9/L (ref 150–450)
PLATELET # BLD EST: ABNORMAL 10*3/UL
PROT SERPL-MCNC: 7.4 G/DL (ref 6.8–8.8)
RBC # BLD AUTO: 5.29 10E12/L (ref 4.4–5.9)
RBC MORPH BLD: ABNORMAL
WBC # BLD AUTO: 3.9 10E9/L (ref 4–11)

## 2020-01-23 PROCEDURE — 36415 COLL VENOUS BLD VENIPUNCTURE: CPT | Performed by: INTERNAL MEDICINE

## 2020-01-23 PROCEDURE — 82565 ASSAY OF CREATININE: CPT | Performed by: INTERNAL MEDICINE

## 2020-01-23 PROCEDURE — 80076 HEPATIC FUNCTION PANEL: CPT | Performed by: INTERNAL MEDICINE

## 2020-01-23 PROCEDURE — 85025 COMPLETE CBC W/AUTO DIFF WBC: CPT | Performed by: INTERNAL MEDICINE

## 2020-02-17 DIAGNOSIS — H20.00 HLA-B27 ASSOCIATED ACUTE ANTERIOR UVEITIS: ICD-10-CM

## 2020-02-17 RX ORDER — SULFASALAZINE 500 MG/1
1000 TABLET, DELAYED RELEASE ORAL 2 TIMES DAILY
Qty: 360 TABLET | Refills: 2 | OUTPATIENT
Start: 2020-02-17

## 2020-02-17 NOTE — TELEPHONE ENCOUNTER
sulfaSALAzine ER (AZULFIDINE EN) 500 MG EC tablet 360 tablet 2 11/22/2019  No   Sig - Route: Take 2 tablets (1,000 mg) by mouth 2 times daily - Oral   Sent to pharmacy as: sulfaSALAzine ER (AZULFIDINE EN) 500 MG EC tablet   Class: E-Prescribe   Order: [id number]   E-Prescribing Status: Receipt confirmed by pharmacy (11/22/2019 10:19 AM CST)     Duplicate request.   Closing encounter.     Sydnee Serrano RN

## 2020-04-01 DIAGNOSIS — H20.00 HLA-B27 ASSOCIATED ACUTE ANTERIOR UVEITIS: ICD-10-CM

## 2020-04-03 RX ORDER — LEFLUNOMIDE 10 MG/1
10 TABLET ORAL DAILY
Qty: 90 TABLET | Refills: 2 | OUTPATIENT
Start: 2020-04-03

## 2020-04-03 NOTE — TELEPHONE ENCOUNTER
Routing refill request to provider for review/approval because:  Drug not on the [facility] ref[facility] protocol     Requested Prescriptions   Pending Prescriptions Disp Refills     leflunomide (ARAVA) 10 MG tablet 90 tablet 2     Sig: Take 1 tablet (10 mg) by mouth daily       There is no refill protocol information for this order        Sydnee Serrano RN

## 2020-04-03 NOTE — TELEPHONE ENCOUNTER
RN / MA: leflunomide refill request refused because he should still have refills.  He expressed concern about coming to the clinic in a previous message; please advise him to go in today for labs and assist him with changing his upcoming April appointment to a video visit.   Charles Bunn MD  4/3/2020 9:59 AM

## 2020-04-06 NOTE — TELEPHONE ENCOUNTER
Sent patient a [employer] message relaying the note below.  Will close encounter once patient reviews the message.    Edouard Prater RN....4/6/2020 8:37 AM

## 2020-04-10 DIAGNOSIS — H20.00 HLA-B27 ASSOCIATED ACUTE ANTERIOR UVEITIS: ICD-10-CM

## 2020-04-10 LAB
ALBUMIN SERPL-MCNC: 4 G/DL (ref 3.4–5)
ALP SERPL-CCNC: 51 U/L (ref 40–150)
ALT SERPL W P-5'-P-CCNC: 41 U/L (ref 0–70)
AST SERPL W P-5'-P-CCNC: 24 U/L (ref 0–45)
BASOPHILS # BLD AUTO: 0 10E9/L (ref 0–0.2)
BASOPHILS NFR BLD AUTO: 0.9 %
BILIRUB DIRECT SERPL-MCNC: 0.2 MG/DL (ref 0–0.2)
BILIRUB SERPL-MCNC: 0.6 MG/DL (ref 0.2–1.3)
CREAT SERPL-MCNC: 0.84 MG/DL (ref 0.66–1.25)
CRP SERPL-MCNC: <2.9 MG/L (ref 0–8)
DIFFERENTIAL METHOD BLD: NORMAL
EOSINOPHIL # BLD AUTO: 0.2 10E9/L (ref 0–0.7)
EOSINOPHIL NFR BLD AUTO: 3.5 %
ERYTHROCYTE [DISTWIDTH] IN BLOOD BY AUTOMATED COUNT: 13.4 % (ref 10–15)
ERYTHROCYTE [SEDIMENTATION RATE] IN BLOOD BY WESTERGREN METHOD: 4 MM/H (ref 0–20)
GFR SERPL CREATININE-BSD FRML MDRD: >90 ML/MIN/{1.73_M2}
HCT VFR BLD AUTO: 46.1 % (ref 40–53)
HGB BLD-MCNC: 15.6 G/DL (ref 13.3–17.7)
LYMPHOCYTES # BLD AUTO: 1.1 10E9/L (ref 0.8–5.3)
LYMPHOCYTES NFR BLD AUTO: 23.8 %
MCH RBC QN AUTO: 29.5 PG (ref 26.5–33)
MCHC RBC AUTO-ENTMCNC: 33.8 G/DL (ref 31.5–36.5)
MCV RBC AUTO: 87 FL (ref 78–100)
MONOCYTES # BLD AUTO: 0.9 10E9/L (ref 0–1.3)
MONOCYTES NFR BLD AUTO: 18.4 %
NEUTROPHILS # BLD AUTO: 2.5 10E9/L (ref 1.6–8.3)
NEUTROPHILS NFR BLD AUTO: 53.4 %
PLATELET # BLD AUTO: 270 10E9/L (ref 150–450)
PROT SERPL-MCNC: 7.6 G/DL (ref 6.8–8.8)
RBC # BLD AUTO: 5.29 10E12/L (ref 4.4–5.9)
WBC # BLD AUTO: 4.6 10E9/L (ref 4–11)

## 2020-04-10 PROCEDURE — 82565 ASSAY OF CREATININE: CPT | Performed by: INTERNAL MEDICINE

## 2020-04-10 PROCEDURE — 80076 HEPATIC FUNCTION PANEL: CPT | Performed by: INTERNAL MEDICINE

## 2020-04-10 PROCEDURE — 86140 C-REACTIVE PROTEIN: CPT | Performed by: INTERNAL MEDICINE

## 2020-04-10 PROCEDURE — 85025 COMPLETE CBC W/AUTO DIFF WBC: CPT | Performed by: INTERNAL MEDICINE

## 2020-04-10 PROCEDURE — 36415 COLL VENOUS BLD VENIPUNCTURE: CPT | Performed by: INTERNAL MEDICINE

## 2020-04-10 PROCEDURE — 85652 RBC SED RATE AUTOMATED: CPT | Performed by: INTERNAL MEDICINE

## 2020-04-24 ENCOUNTER — VIRTUAL VISIT (OUTPATIENT)
Dept: RHEUMATOLOGY | Facility: CLINIC | Age: 50
End: 2020-04-24
Payer: COMMERCIAL

## 2020-04-24 DIAGNOSIS — H20.00 HLA-B27 ASSOCIATED ACUTE ANTERIOR UVEITIS: Primary | ICD-10-CM

## 2020-04-24 DIAGNOSIS — Z79.899 HIGH RISK MEDICATIONS (NOT ANTICOAGULANTS) LONG-TERM USE: ICD-10-CM

## 2020-04-24 DIAGNOSIS — M06.4 INFLAMMATORY POLYARTHROPATHY (H): ICD-10-CM

## 2020-04-24 PROCEDURE — 99213 OFFICE O/P EST LOW 20 MIN: CPT | Mod: 95 | Performed by: INTERNAL MEDICINE

## 2020-04-24 RX ORDER — LEFLUNOMIDE 10 MG/1
10 TABLET ORAL DAILY
Qty: 90 TABLET | Refills: 2 | Status: SHIPPED | OUTPATIENT
Start: 2020-04-24 | End: 2020-10-09

## 2020-04-24 RX ORDER — SULFASALAZINE 500 MG/1
1000 TABLET, DELAYED RELEASE ORAL 2 TIMES DAILY
Qty: 360 TABLET | Refills: 2 | Status: SHIPPED | OUTPATIENT
Start: 2020-04-24 | End: 2020-10-09

## 2020-04-24 NOTE — PROGRESS NOTES
"Bry Bingham is a 50 year old male who is being evaluated via a billable telephone visit.      The patient has been notified of following:     \"This telephone visit will be conducted via a call between you and your physician/provider. We have found that certain health care needs can be provided without the need for a physical exam.  This service lets us provide the care you need with a short phone conversation.  If a prescription is necessary we can send it directly to your pharmacy.  If lab work is needed we can place an order for that and you can then stop by our lab to have the test done at a later time.    Telephone visits are billed at different rates depending on your insurance coverage. During this emergency period, for some insurers they may be billed the same as an in-person visit.  Please reach out to your insurance provider with any questions.    If during the course of the call the physician/provider feels a telephone visit is not appropriate, you will not be charged for this service.\"    Patient has given verbal consent for Telephone visit?  Yes            Rheumatology Telephone / Telehealth Visit      Bry Bingham MRN# [id number]   YOB: 1970 Age: 50 year old      Date of visit: 4/24/20   Ophthalmologist: Dr. Luis Eduardo Young from [facility]   PCP: Hood Melo at [facility]    Chief Complaint   Patient presents with:  Arthritis    Assessment and Plan     1.  HLA-B27 associated recurrent left anterior uveitis: Acute onset per ophthalmologist.  HLA-B27 positive.  History of rheumatoid arthritis per patient report but no active synovitis seen on initial exam.   Sudden onset unilateral anterior uveitis was suggestive of an HLA-B27 association.  Reportedly had uveitis since 2009; was tx'd with MTX successfully for many years but he stopped it because of associated fatigue and did well for about 3-4 years until the uveitis flared and steroid eye drops twice daily " only partially controlled the left eye symptoms.    Leflunomide 20 mg daily was started with good benefit but liver enzymes elevated so it was reduced to leflunomide 10 mg daily.  He then presented with symmetric synovitis of the MCPs, so sulfasalazine was added with resolution of his inflammatory arthritis.  At this point he is on leflunomide 10 mg daily and sulfasalazine 1000 mg twice daily with good control of arthritis and only one recurrence of uveitis since DMARDs started; he is to notify me if uveitis flares again and understands to follow-up with his ophthalmologist too if this happens.   - Continue sulfasalazine 1000mg BID   - Continue leflunomide 10mg daily   - Labs in 3 months: CBC, Creatinine, Hepatic Panel  - Labs in 6 months: CBC, Creatinine, Hepatic Panel, ESR, CRP     2. 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 See #1.  Doing well today.    3. Chewing tobacco: Encouraged complete cessation and discussed the health benefits.      # Relevant labs and imaging were reviewed with the patient    # High risk medication toxicity monitoring: discussion and labs reviewed; appropriate labs ordered. See above    # Note that this is a virtual visit to reduce the risk of COVID-19 exposure during this current pandemic.      Mr. Bingham verbalized agreement with and understanding of the rational for the diagnosis and treatment plan.  All questions were answered to best of my ability and the patient's satisfaction. Mr. Bingham was advised to contact the clinic with any questions that may arise after the clinic visit.      Thank you for involving me in the care of the patient    Return to clinic: 6 months    HPI   Bry Bingham is a 50 year old male with a past medical history significant for iritis and rheumatoid arthritis who is seen for follow-up of recurrent left acute anterior uveitis.    Today, Mr. Bingham reports that he is doing great.  No joint pain.  No morning stiffness.  One recurrence of uveitis  resolved with 10 days of steroid eye drops from his ophthalmologist.  Tolerating sulfasalazine and leflunomide well.  No concerns.  Happy with how well he is doing.  No new issues.     Denies fevers, chills, nausea, vomiting, constipation, diarrhea. No abdominal pain. No chest pain/pressure, palpitations, or shortness of breath. No LE swelling. No neck pain.  No lower back pain or lower back stiffness.  No oral or nasal sores.  No rash. No sicca symptoms.  No Raynaud's phenomenon symptoms.     Tobacco: Chewing tobacco  EtOH: 1-2 drinks every 2 weeks  Drugs: None  Occupation:  for [employer]   GEN: No fevers, chills, night sweats, or weight change  SKIN: No itching, rashes, sores  HEENT:  No oral or nasal ulcers.  See HPI  CV: No chest pain, pressure, palpitations, or dyspnea on exertion.  PULM: No SOB, wheeze, cough.  GI: No nausea, vomiting, constipation, diarrhea. No blood in stool. No abdominal pain.  : No blood in urine.  MSK: See HPI.  NEURO: No numbness, tingling, or weakness.  EXT: No LE swelling  PSYCH: Negative    Active Problem List     Patient Active Problem List   Diagnosis     HLA-B27 associated acute anterior uveitis     Past Medical History   History reviewed. No pertinent past medical history.  Past Surgical History   History reviewed. No pertinent surgical history.  Allergy   No Known Allergies  Current Medication List     Current Outpatient Medications   Medication Sig     leflunomide (ARAVA) 10 MG tablet Take 1 tablet (10 mg) by mouth daily     sulfaSALAzine ER (AZULFIDINE EN) 500 MG EC tablet Take 2 tablets (1,000 mg) by mouth 2 times daily     No current facility-administered medications for this visit.          Social History   See HPI    Family History     Denies family history of rheumatologic disease    Physical Exam     Temp Readings from Last 3 Encounters:   12/17/18 98  F (36.7  C) (Oral)     BP Readings from Last 5 Encounters:   11/22/19 130/88   08/02/19 134/80    05/10/19 136/88   12/17/18 (!) 156/98   08/06/18 (!) 144/91     Pulse Readings from Last 1 Encounters:   11/22/19 89     Resp Readings from Last 1 Encounters:   08/06/18 16     There is no height or weight on file to calculate BMI.      Telephone visit    Labs / Imaging (select studies)   [facility] labs on 12/3/2012: HLA-B27 positive    RF/CCP  Recent Labs   Lab Test 08/06/18  0929   CCPIGG 1   RHF <20     CBC  Recent Labs   Lab Test 04/10/20  1048 01/23/20  0758 10/11/19  0953   WBC 4.6 3.9* 4.5   RBC 5.29 5.29 5.41   HGB 15.6 15.4 16.0   HCT 46.1 46.0 46.6   MCV 87 87 86   RDW 13.4 12.7 13.1    251 257   MCH 29.5 29.1 29.6   MCHC 33.8 33.5 34.3   NEUTROPHIL 53.4 49.0 45.0   LYMPH 23.8 26.0 30.0   MONOCYTE 18.4 17.0 18.0   EOSINOPHIL 3.5 7.0 6.0   BASOPHIL 0.9 1.0 1.0   ANEU 2.5 1.9 2.0   ALYM 1.1 1.0 1.4   AURORA 0.9 0.7 0.8   AEOS 0.2 0.3 0.3   ABAS 0.0 0.0 0.0     CMP  Recent Labs   Lab Test 04/10/20  1048 01/23/20  0758 10/11/19  0953  08/06/18  0929   NA  --   --   --   --  137   POTASSIUM  --   --   --   --  4.7   CHLORIDE  --   --   --   --  104   CO2  --   --   --   --  25   ANIONGAP  --   --   --   --  8   GLC  --   --   --   --  84   BUN  --   --   --   --  14   CR 0.84 0.84 0.85   < > 0.82   GFRESTIMATED >90 >90 >90   < > >90   GFRESTBLACK >90 >90 >90   < > >90   NICOLA  --   --   --   --  8.8   BILITOTAL 0.6 0.5 1.1   < > 0.5   ALBUMIN 4.0 4.1 4.0   < > 3.8   PROTTOTAL 7.6 7.4 7.6   < > 7.7   ALKPHOS 51 53 44   < > 70   AST 24 33 26   < > 24   ALT 41 41 35   < > 41    < > = values in this interval not displayed.     Calcium/VitaminD  Recent Labs   Lab Test 08/06/18  0929   NICOLA 8.8     ESR/CRP  Recent Labs   Lab Test 04/10/20  1048 10/11/19  0953 07/12/19  0946   SED 4 4 4   CRP <2.9 <2.9 <2.9     Hepatitis B  Recent Labs   Lab Test 08/06/18  0929   HBCAB Nonreactive   HEPBANG Nonreactive     Hepatitis C  Recent Labs   Lab Test 08/06/18  0929   HCVAB Nonreactive     Lyme ab screening  Recent  Labs   Lab Test 08/06/18  0929   LYMEGM 0.06     Tuberculosis Screening  Recent Labs   Lab Test 08/06/18  0929   TBRES Negative     Immunization History     Immunization History   Administered Date(s) Administered     Pneumo Conj 13-V (2010&after) 12/17/2018     Pneumococcal 23 valent 05/10/2019          Chart documentation done in part with Dragon Voice recognition Software. Although reviewed after completion, some word and grammatical error may remain.      Phone call start time: 10:40 AM  Phone call end time: 10:55 AM    This visit is equivalent to a 76579 visit    Location of patient: home  Location of provider: home    Follow up:  follow up appointment scheduled to be in October    Charles Bunn MD  4/24/2020

## 2020-07-07 DIAGNOSIS — M06.4 INFLAMMATORY POLYARTHROPATHY (H): ICD-10-CM

## 2020-07-07 DIAGNOSIS — Z79.899 HIGH RISK MEDICATIONS (NOT ANTICOAGULANTS) LONG-TERM USE: ICD-10-CM

## 2020-07-07 DIAGNOSIS — H20.00 HLA-B27 ASSOCIATED ACUTE ANTERIOR UVEITIS: ICD-10-CM

## 2020-07-07 LAB
ALBUMIN SERPL-MCNC: 3.7 G/DL (ref 3.4–5)
ALP SERPL-CCNC: 48 U/L (ref 40–150)
ALT SERPL W P-5'-P-CCNC: 38 U/L (ref 0–70)
AST SERPL W P-5'-P-CCNC: 24 U/L (ref 0–45)
BILIRUB DIRECT SERPL-MCNC: 0.2 MG/DL (ref 0–0.2)
BILIRUB SERPL-MCNC: 0.8 MG/DL (ref 0.2–1.3)
CREAT SERPL-MCNC: 0.89 MG/DL (ref 0.66–1.25)
DIFFERENTIAL METHOD BLD: ABNORMAL
EOSINOPHIL # BLD AUTO: 0.1 10E9/L (ref 0–0.7)
EOSINOPHIL NFR BLD AUTO: 3 %
ERYTHROCYTE [DISTWIDTH] IN BLOOD BY AUTOMATED COUNT: 12.9 % (ref 10–15)
GFR SERPL CREATININE-BSD FRML MDRD: >90 ML/MIN/{1.73_M2}
HCT VFR BLD AUTO: 43.2 % (ref 40–53)
HGB BLD-MCNC: 14.8 G/DL (ref 13.3–17.7)
LYMPHOCYTES # BLD AUTO: 1.1 10E9/L (ref 0.8–5.3)
LYMPHOCYTES NFR BLD AUTO: 22 %
MCH RBC QN AUTO: 29.4 PG (ref 26.5–33)
MCHC RBC AUTO-ENTMCNC: 34.3 G/DL (ref 31.5–36.5)
MCV RBC AUTO: 86 FL (ref 78–100)
MONOCYTES # BLD AUTO: 1.5 10E9/L (ref 0–1.3)
MONOCYTES NFR BLD AUTO: 30 %
NEUTROPHILS # BLD AUTO: 2.2 10E9/L (ref 1.6–8.3)
NEUTROPHILS NFR BLD AUTO: 45 %
PLATELET # BLD AUTO: 295 10E9/L (ref 150–450)
PROT SERPL-MCNC: 7.3 G/DL (ref 6.8–8.8)
RBC # BLD AUTO: 5.04 10E12/L (ref 4.4–5.9)
WBC # BLD AUTO: 4.9 10E9/L (ref 4–11)

## 2020-07-07 PROCEDURE — 36415 COLL VENOUS BLD VENIPUNCTURE: CPT | Performed by: INTERNAL MEDICINE

## 2020-07-07 PROCEDURE — 80076 HEPATIC FUNCTION PANEL: CPT | Performed by: INTERNAL MEDICINE

## 2020-07-07 PROCEDURE — 85025 COMPLETE CBC W/AUTO DIFF WBC: CPT | Performed by: INTERNAL MEDICINE

## 2020-07-07 PROCEDURE — 82565 ASSAY OF CREATININE: CPT | Performed by: INTERNAL MEDICINE

## 2020-10-08 NOTE — PROGRESS NOTES
"Bry Bingham is a 50 year old male who is being evaluated via a billable video visit.      The patient has been notified of following:     \"This video visit will be conducted via a call between you and your physician/provider. We have found that certain health care needs can be provided without the need for an in-person physical exam.  This service lets us provide the care you need with a video conversation.  If a prescription is necessary we can send it directly to your pharmacy.  If lab work is needed we can place an order for that and you can then stop by our lab to have the test done at a later time.    Video visits are billed at different rates depending on your insurance coverage.  Please reach out to your insurance provider with any questions.    If during the course of the call the physician/provider feels a video visit is not appropriate, you will not be charged for this service.\"    Patient has given verbal consent for Video visit? Yes  How would you like to obtain your AVS? MyChart  If you are dropped from the video visit, the video invite should be resent to: Text to cell phone: [phone]  Will anyone else be joining your video visit? No    Rheumatology Video  Visit      Bry Bingham MRN# [id number]   YOB: 1970 Age: 50 year old      Date of visit: 10/09/20   Ophthalmologist: Dr. Luis Eduardo Young from [facility]   PCP: Hood Melo at [facility]    Chief Complaint   Patient presents with:  RECHECK    Assessment and Plan     1.  HLA-B27 associated recurrent left anterior uveitis: Acute onset per ophthalmologist.  HLA-B27 positive.  History of rheumatoid arthritis per patient report but no active synovitis seen on initial exam.   Sudden onset unilateral anterior uveitis was suggestive of an HLA-B27 association.  Reportedly had uveitis since 2009; was tx'd with MTX successfully for many years but he stopped it because of associated fatigue and did well for about " 3-4 years until the uveitis flared and steroid eye drops twice daily only partially controlled the left eye symptoms.    Leflunomide 20 mg daily was started with good benefit but liver enzymes elevated so it was reduced to leflunomide 10 mg daily.  He then presented with symmetric synovitis of the MCPs, so sulfasalazine was added with resolution of his inflammatory arthritis.  Currently on leflunomide 10mg daily and  At this point he is on leflunomide 10 mg daily and sulfasalazine 1000 mg twice daily with good control of arthritis and only one recurrence of uveitis since DMARDs started; he is to notify me if uveitis flares again and understands to follow-up with his ophthalmologist too if this happens.   - Continue sulfasalazine 1000mg BID   - Continue leflunomide 10mg daily   - Labs within 1 week: CBC, Creatinine, Hepatic Panel, ESR, CRP  - Labs in 3 months: CBC, Creatinine, Hepatic Panel  - Labs in 6 months: CBC, Creatinine, Hepatic Panel, ESR, CRP     2. 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 See #1.  Doing well today.    3. Chewing tobacco: Encouraged complete cessation and discussed the health benefits.      4.  Vaccinations: Vaccinations reviewed with Mr. Bingham.  Risks and benefits of vaccinations were discussed.    - Influenza: encouraged yearly vaccination  - [id number]: up to date  - [id number]: up to date  - Shingrix: up to date    # Relevant labs and imaging were reviewed with the patient    # High risk medication toxicity monitoring: discussion and labs reviewed; appropriate labs ordered. See above.  Instructed that if confirmed to have COVID-19 or exposure to someone with confirmed COVID-19 to call this clinic for directions on DMARD management.    # Note that this is a virtual visit to reduce the risk of COVID-19 exposure during this current pandemic.      # Considered to be at high risk of complications from the COVID-19 virus.  It is recommended to limit contact with other people and if  possible to work remotely or provide a leave of absence to reduce the risk for COVID-19.        Mr. Bingham verbalized agreement with and understanding of the rational for the diagnosis and treatment plan.  All questions were answered to best of my ability and the patient's satisfaction. Mr. Bingham was advised to contact the clinic with any questions that may arise after the clinic visit.      Thank you for involving me in the care of the patient    Return to clinic: 6 months    HPI   Bry Bingham is a 50 year old male with a past medical history significant for iritis and rheumatoid arthritis who is seen for follow-up of recurrent left acute anterior uveitis.    Today, Mr. Bingham reports that he is doing great.  No joint pain/swelling.  No morning stiffness or gelling phenomenone.  No recurrence of uveitis since last seen. Tolerating sulfasalazine and leflunomide well.  No concerns.  Happy with how well he is doing.       Denies fevers, chills, nausea, vomiting, constipation, diarrhea. No abdominal pain. No chest pain/pressure, palpitations, or shortness of breath. No LE swelling. No neck pain.  No lower back pain or lower back stiffness.  No oral or nasal sores.  No rash.    Tobacco: Chewing tobacco  EtOH: 1-2 drinks every 2 weeks  Drugs: None  Occupation:  for [employer]   GEN: No fevers, chills, night sweats, or weight change  SKIN: No itching, rashes, sores  HEENT:  No oral or nasal ulcers.  See HPI  CV: No chest pain, pressure, palpitations, or dyspnea on exertion.  PULM: No SOB, wheeze, cough.  GI: No nausea, vomiting, constipation, diarrhea. No blood in stool. No abdominal pain.  : No blood in urine.  MSK: See HPI.  NEURO: No numbness, tingling, or weakness.  EXT: No LE swelling  PSYCH: Negative    Active Problem List     Patient Active Problem List   Diagnosis     HLA-B27 associated acute anterior uveitis     Past Medical History   History reviewed. No pertinent past medical history.  Past  Surgical History   History reviewed. No pertinent surgical history.  Allergy   No Known Allergies  Current Medication List     Current Outpatient Medications   Medication Sig     leflunomide (ARAVA) 10 MG tablet Take 1 tablet (10 mg) by mouth daily     sulfaSALAzine ER (AZULFIDINE EN) 500 MG EC tablet Take 2 tablets (1,000 mg) by mouth 2 times daily     No current facility-administered medications for this visit.          Social History   See HPI    Family History     Denies family history of rheumatologic disease    Physical Exam     Temp Readings from Last 3 Encounters:   12/17/18 98  F (36.7  C) (Oral)     BP Readings from Last 5 Encounters:   11/22/19 130/88   08/02/19 134/80   05/10/19 136/88   12/17/18 (!) 156/98   08/06/18 (!) 144/91     Pulse Readings from Last 1 Encounters:   11/22/19 89     Resp Readings from Last 1 Encounters:   08/06/18 16     There is no height or weight on file to calculate BMI.    GEN: NAD. Healthy appearing adult.   HEENT: MMM.  Anicteric, noninjected sclera. No obvious external lesions of the ear and nose. Hearing intact.  PULM: No increased work of breathing  MSK:  Hands and wrists without swelling. Heberden's nodes present.   SKIN: No rash or jaundice seen  PSYCH: Alert. Appropriate.     Labs / Imaging (select studies)   [facility] labs on 12/3/2012: HLA-B27 positive    RF/CCP  Recent Labs   Lab Test 08/06/18  0929   CCPIGG 1   RHF <20     CBC  Recent Labs   Lab Test 07/07/20  1350 04/10/20  1048 01/23/20  0758 10/11/19  0953   WBC 4.9 4.6 3.9* 4.5   RBC 5.04 5.29 5.29 5.41   HGB 14.8 15.6 15.4 16.0   HCT 43.2 46.1 46.0 46.6   MCV 86 87 87 86   RDW 12.9 13.4 12.7 13.1    270 251 257   MCH 29.4 29.5 29.1 29.6   MCHC 34.3 33.8 33.5 34.3   NEUTROPHIL 45.0 53.4 49.0 45.0   LYMPH 22.0 23.8 26.0 30.0   MONOCYTE 30.0 18.4 17.0 18.0   EOSINOPHIL 3.0 3.5 7.0 6.0   BASOPHIL  --  0.9 1.0 1.0   ANEU 2.2 2.5 1.9 2.0   ALYM 1.1 1.1 1.0 1.4   AURORA 1.5* 0.9 0.7 0.8   AEOS 0.1 0.2 0.3  0.3   ABAS  --  0.0 0.0 0.0     CMP  Recent Labs   Lab Test 07/07/20  1350 04/10/20  1048 01/23/20  0758 08/06/18  0929 08/06/18  0929   NA  --   --   --   --  137   POTASSIUM  --   --   --   --  4.7   CHLORIDE  --   --   --   --  104   CO2  --   --   --   --  25   ANIONGAP  --   --   --   --  8   GLC  --   --   --   --  84   BUN  --   --   --   --  14   CR 0.89 0.84 0.84   < > 0.82   GFRESTIMATED >90 >90 >90   < > >90   GFRESTBLACK >90 >90 >90   < > >90   NICOLA  --   --   --   --  8.8   BILITOTAL 0.8 0.6 0.5   < > 0.5   ALBUMIN 3.7 4.0 4.1   < > 3.8   PROTTOTAL 7.3 7.6 7.4   < > 7.7   ALKPHOS 48 51 53   < > 70   AST 24 24 33   < > 24   ALT 38 41 41   < > 41    < > = values in this interval not displayed.     Calcium/VitaminD  Recent Labs   Lab Test 08/06/18  0929   NICOLA 8.8     ESR/CRP  Recent Labs   Lab Test 04/10/20  1048 10/11/19  0953 07/12/19  0946   SED 4 4 4   CRP <2.9 <2.9 <2.9     Hepatitis B  Recent Labs   Lab Test 08/06/18  0929   HBCAB Nonreactive   HEPBANG Nonreactive     Hepatitis C  Recent Labs   Lab Test 08/06/18  0929   HCVAB Nonreactive     Lyme ab screening  Recent Labs   Lab Test 08/06/18  0929   LYMEGM 0.06       Tuberculosis Screening  Recent Labs   Lab Test 08/06/18  0929   TBRES Negative     Immunization History     Immunization History   Administered Date(s) Administered     Pneumo Conj 13-V (2010&after) 12/17/2018     Pneumococcal 23 valent 05/10/2019          Chart documentation done in part with Dragon Voice recognition Software. Although reviewed after completion, some word and grammatical error may remain.      Video-Visit Details    Type of service:  Video Visit    Video Start Time: 11:10 AM  Video End Time: 11:18 AM    Originating Location (pt. Location): work, in [place]    Distant Location (provider location):  Home    Platform used for Video Visit: Leroy Bunn MD

## 2020-10-09 ENCOUNTER — VIRTUAL VISIT (OUTPATIENT)
Dept: RHEUMATOLOGY | Facility: CLINIC | Age: 50
End: 2020-10-09
Payer: COMMERCIAL

## 2020-10-09 DIAGNOSIS — H20.00 HLA-B27 ASSOCIATED ACUTE ANTERIOR UVEITIS: Primary | ICD-10-CM

## 2020-10-09 DIAGNOSIS — M06.4 INFLAMMATORY POLYARTHROPATHY (H): ICD-10-CM

## 2020-10-09 DIAGNOSIS — Z79.899 HIGH RISK MEDICATIONS (NOT ANTICOAGULANTS) LONG-TERM USE: ICD-10-CM

## 2020-10-09 PROCEDURE — 99213 OFFICE O/P EST LOW 20 MIN: CPT | Mod: 95 | Performed by: INTERNAL MEDICINE

## 2020-10-09 RX ORDER — LEFLUNOMIDE 10 MG/1
10 TABLET ORAL DAILY
Qty: 90 TABLET | Refills: 2 | Status: SHIPPED | OUTPATIENT
Start: 2020-10-09 | End: 2021-04-09

## 2020-10-09 RX ORDER — SULFASALAZINE 500 MG/1
1000 TABLET, DELAYED RELEASE ORAL 2 TIMES DAILY
Qty: 360 TABLET | Refills: 2 | Status: SHIPPED | OUTPATIENT
Start: 2020-10-09 | End: 2021-04-09

## 2020-10-19 DIAGNOSIS — Z79.899 HIGH RISK MEDICATIONS (NOT ANTICOAGULANTS) LONG-TERM USE: ICD-10-CM

## 2020-10-19 DIAGNOSIS — M06.4 INFLAMMATORY POLYARTHROPATHY (H): ICD-10-CM

## 2020-10-19 DIAGNOSIS — H20.00 HLA-B27 ASSOCIATED ACUTE ANTERIOR UVEITIS: ICD-10-CM

## 2020-10-19 LAB
ALBUMIN SERPL-MCNC: 4 G/DL (ref 3.4–5)
ALP SERPL-CCNC: 55 U/L (ref 40–150)
ALT SERPL W P-5'-P-CCNC: 40 U/L (ref 0–70)
AST SERPL W P-5'-P-CCNC: 28 U/L (ref 0–45)
BASOPHILS # BLD AUTO: 0 10E9/L (ref 0–0.2)
BASOPHILS NFR BLD AUTO: 0.8 %
BILIRUB DIRECT SERPL-MCNC: 0.3 MG/DL (ref 0–0.2)
BILIRUB SERPL-MCNC: 1.4 MG/DL (ref 0.2–1.3)
CREAT SERPL-MCNC: 0.88 MG/DL (ref 0.66–1.25)
CRP SERPL-MCNC: <2.9 MG/L (ref 0–8)
DIFFERENTIAL METHOD BLD: NORMAL
EOSINOPHIL # BLD AUTO: 0.1 10E9/L (ref 0–0.7)
EOSINOPHIL NFR BLD AUTO: 2.6 %
ERYTHROCYTE [DISTWIDTH] IN BLOOD BY AUTOMATED COUNT: 12.9 % (ref 10–15)
ERYTHROCYTE [SEDIMENTATION RATE] IN BLOOD BY WESTERGREN METHOD: 4 MM/H (ref 0–20)
GFR SERPL CREATININE-BSD FRML MDRD: >90 ML/MIN/{1.73_M2}
HCT VFR BLD AUTO: 46.7 % (ref 40–53)
HGB BLD-MCNC: 16 G/DL (ref 13.3–17.7)
LYMPHOCYTES # BLD AUTO: 1.1 10E9/L (ref 0.8–5.3)
LYMPHOCYTES NFR BLD AUTO: 22.2 %
MCH RBC QN AUTO: 29.5 PG (ref 26.5–33)
MCHC RBC AUTO-ENTMCNC: 34.3 G/DL (ref 31.5–36.5)
MCV RBC AUTO: 86 FL (ref 78–100)
MONOCYTES # BLD AUTO: 0.9 10E9/L (ref 0–1.3)
MONOCYTES NFR BLD AUTO: 17.8 %
NEUTROPHILS # BLD AUTO: 2.8 10E9/L (ref 1.6–8.3)
NEUTROPHILS NFR BLD AUTO: 56.6 %
PLATELET # BLD AUTO: 265 10E9/L (ref 150–450)
PROT SERPL-MCNC: 7.8 G/DL (ref 6.8–8.8)
RBC # BLD AUTO: 5.43 10E12/L (ref 4.4–5.9)
WBC # BLD AUTO: 5 10E9/L (ref 4–11)

## 2020-10-19 PROCEDURE — 82565 ASSAY OF CREATININE: CPT | Performed by: INTERNAL MEDICINE

## 2020-10-19 PROCEDURE — 80076 HEPATIC FUNCTION PANEL: CPT | Performed by: INTERNAL MEDICINE

## 2020-10-19 PROCEDURE — 85652 RBC SED RATE AUTOMATED: CPT | Performed by: INTERNAL MEDICINE

## 2020-10-19 PROCEDURE — 85025 COMPLETE CBC W/AUTO DIFF WBC: CPT | Performed by: INTERNAL MEDICINE

## 2020-10-19 PROCEDURE — 86140 C-REACTIVE PROTEIN: CPT | Performed by: INTERNAL MEDICINE

## 2020-10-19 PROCEDURE — 36415 COLL VENOUS BLD VENIPUNCTURE: CPT | Performed by: INTERNAL MEDICINE

## 2020-11-16 ENCOUNTER — HEALTH MAINTENANCE LETTER (OUTPATIENT)
Age: 50
End: 2020-11-16

## 2021-02-08 DIAGNOSIS — H20.00 HLA-B27 ASSOCIATED ACUTE ANTERIOR UVEITIS: ICD-10-CM

## 2021-02-08 DIAGNOSIS — M06.4 INFLAMMATORY POLYARTHROPATHY (H): ICD-10-CM

## 2021-02-08 DIAGNOSIS — Z79.899 HIGH RISK MEDICATIONS (NOT ANTICOAGULANTS) LONG-TERM USE: ICD-10-CM

## 2021-02-08 LAB
ALBUMIN SERPL-MCNC: 3.8 G/DL (ref 3.4–5)
ALP SERPL-CCNC: 49 U/L (ref 40–150)
ALT SERPL W P-5'-P-CCNC: 37 U/L (ref 0–70)
AST SERPL W P-5'-P-CCNC: 29 U/L (ref 0–45)
BASOPHILS # BLD AUTO: 0 10E9/L (ref 0–0.2)
BASOPHILS NFR BLD AUTO: 0.9 %
BILIRUB DIRECT SERPL-MCNC: 0.2 MG/DL (ref 0–0.2)
BILIRUB SERPL-MCNC: 0.9 MG/DL (ref 0.2–1.3)
CREAT SERPL-MCNC: 1.04 MG/DL (ref 0.66–1.25)
DIFFERENTIAL METHOD BLD: NORMAL
EOSINOPHIL # BLD AUTO: 0.2 10E9/L (ref 0–0.7)
EOSINOPHIL NFR BLD AUTO: 5.3 %
ERYTHROCYTE [DISTWIDTH] IN BLOOD BY AUTOMATED COUNT: 13.3 % (ref 10–15)
GFR SERPL CREATININE-BSD FRML MDRD: 82 ML/MIN/{1.73_M2}
HCT VFR BLD AUTO: 47.2 % (ref 40–53)
HGB BLD-MCNC: 16.1 G/DL (ref 13.3–17.7)
LYMPHOCYTES # BLD AUTO: 1 10E9/L (ref 0.8–5.3)
LYMPHOCYTES NFR BLD AUTO: 22.2 %
MCH RBC QN AUTO: 29.7 PG (ref 26.5–33)
MCHC RBC AUTO-ENTMCNC: 34.1 G/DL (ref 31.5–36.5)
MCV RBC AUTO: 87 FL (ref 78–100)
MONOCYTES # BLD AUTO: 0.9 10E9/L (ref 0–1.3)
MONOCYTES NFR BLD AUTO: 19.3 %
NEUTROPHILS # BLD AUTO: 2.4 10E9/L (ref 1.6–8.3)
NEUTROPHILS NFR BLD AUTO: 52.3 %
PLATELET # BLD AUTO: 269 10E9/L (ref 150–450)
PROT SERPL-MCNC: 7.3 G/DL (ref 6.8–8.8)
RBC # BLD AUTO: 5.42 10E12/L (ref 4.4–5.9)
WBC # BLD AUTO: 4.6 10E9/L (ref 4–11)

## 2021-02-08 PROCEDURE — 80076 HEPATIC FUNCTION PANEL: CPT | Performed by: INTERNAL MEDICINE

## 2021-02-08 PROCEDURE — 36415 COLL VENOUS BLD VENIPUNCTURE: CPT | Performed by: INTERNAL MEDICINE

## 2021-02-08 PROCEDURE — 82565 ASSAY OF CREATININE: CPT | Performed by: INTERNAL MEDICINE

## 2021-02-08 PROCEDURE — 85025 COMPLETE CBC W/AUTO DIFF WBC: CPT | Performed by: INTERNAL MEDICINE

## 2021-04-09 ENCOUNTER — VIRTUAL VISIT (OUTPATIENT)
Dept: RHEUMATOLOGY | Facility: CLINIC | Age: 51
End: 2021-04-09
Payer: COMMERCIAL

## 2021-04-09 DIAGNOSIS — Z79.899 HIGH RISK MEDICATIONS (NOT ANTICOAGULANTS) LONG-TERM USE: ICD-10-CM

## 2021-04-09 DIAGNOSIS — H20.00 HLA-B27 ASSOCIATED ACUTE ANTERIOR UVEITIS: ICD-10-CM

## 2021-04-09 DIAGNOSIS — M06.4 INFLAMMATORY POLYARTHROPATHY (H): Primary | ICD-10-CM

## 2021-04-09 PROCEDURE — 99214 OFFICE O/P EST MOD 30 MIN: CPT | Mod: 95 | Performed by: INTERNAL MEDICINE

## 2021-04-09 RX ORDER — SULFASALAZINE 500 MG/1
1000 TABLET, DELAYED RELEASE ORAL 2 TIMES DAILY
Qty: 360 TABLET | Refills: 1 | Status: SHIPPED | OUTPATIENT
Start: 2021-04-09 | End: 2022-04-18

## 2021-04-09 RX ORDER — LEFLUNOMIDE 10 MG/1
10 TABLET ORAL DAILY
Qty: 90 TABLET | Refills: 1 | Status: SHIPPED | OUTPATIENT
Start: 2021-04-09 | End: 2022-04-18

## 2021-04-09 RX ORDER — LISINOPRIL 10 MG/1
TABLET ORAL
COMMUNITY
Start: 2021-02-16

## 2021-04-09 NOTE — PROGRESS NOTES
Byr Bingham is a 50 year old year old male who is being evaluated via a billable telephone visit.      What phone number would you like to be contacted at? [phone]  How would you like to obtain your AVS? [facility]       Rheumatology Telephone/Telehealth  Visit      Bry Bingham MRN# [id number]   YOB: 1970 Age: 50 year old      Date of visit: 4/09/21   Ophthalmologist: Dr. Luis Eduardo Young from [facility]   PCP: Hood Melo at [facility]    Chief Complaint   Patient presents with:  HLA-B27 associated acute anterior uveitis    Assessment and Plan     1.  HLA-B27 associated recurrent left anterior uveitis: Acute onset per ophthalmologist.  HLA-B27 positive.  History of rheumatoid arthritis per patient report but no active synovitis seen on initial exam.   Sudden onset unilateral anterior uveitis was suggestive of an HLA-B27 association.  Reportedly had uveitis since 2009; was tx'd with MTX successfully for many years but he stopped it because of associated fatigue and did well for about 3-4 years until the uveitis flared and steroid eye drops twice daily only partially controlled the left eye symptoms.    Leflunomide 20 mg daily was started with good benefit but liver enzymes elevated so it was reduced to leflunomide 10 mg daily.  He then presented with symmetric synovitis of the MCPs, so sulfasalazine was added with resolution of his inflammatory arthritis.  Currently on leflunomide 10mg daily and sulfasalazine 1000 mg twice daily.  Overall doing well with no recurrence of uveitis and arthritis is controlled except for a low level stiffness throughout the day.  Because of the stiffness we discussed the option of escalating therapy with a TNF inhibitor versus continuing on his current medications without change.  He says that his mild stiffness does not interrupt his daily activities and is very tolerable so he would like to remain on his current medication regimen for now.    Chronic illness, stable.    - Continue sulfasalazine 1000mg BID   - Continue leflunomide 10mg daily   - Labs now and every 8-12 weeks: CBC, Creatinine, Hepatic Panel, ESR, CRP    High risk medication requiring intensive toxicity monitoring at least quarterly: labs ordered include CBC, Creatinine, Hepatic panel to monitor for cytopenia and hepatotoxicity; checking creatinine as it affects clearance of medication.      2. 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 See #1.   Chronic illness, progressive.      3. Chewing tobacco: Advised complete cessation     4.  Vaccinations: Vaccinations reviewed with Mr. Bingham.  Risks and benefits of vaccinations were discussed.    - Influenza: encouraged yearly vaccination  - [id number]: up to date  - [id number]: up to date  - Shingrix: up to date  - COVID19 vaccine: Has received 1 dose of the Pfizer COVID-19 vaccine on 3/12/2021 with plans to receive the second dose    # At this time the COVID-19 vaccine (currently available from Pfizer, Moderna, and Nomi) is recommended based on our knowledge of this vaccine and vaccines in the past.  Based on our current knowledge there is no preference for one COVID-19 vaccine over another. Note that there is no data currently available to specifically establish safety and efficacy for these vaccines in immunocompromised people.     Total minutes spent in evaluation with patient, documentation, , and review of pertinent studies and chart notes: 14     Mr. Bingham verbalized agreement with and understanding of the rational for the diagnosis and treatment plan.  All questions were answered to best of my ability and the patient's satisfaction. Mr. Bingham was advised to contact the clinic with any questions that may arise after the clinic visit.      Thank you for involving me in the care of the patient    Return to clinic: 6 months    HPI   Bry Bingham is a 50 year old male with a past medical history significant  for iritis and rheumatoid arthritis who is seen for follow-up of recurrent left acute anterior uveitis.    Today, Mr. Bingham reports that he is doing well.  No joint pain or swelling.  However, he has very mild stiffness throughout the day that does not affect what he does during the day and is not severe enough that he would like to change treatment.  No recurrence of uveitis since last seen.  Happy with how well he is doing.    Denies fevers, chills, nausea, vomiting, constipation, diarrhea. No abdominal pain. No chest pain/pressure, palpitations, or shortness of breath. No LE swelling. No neck pain.  No lower back pain or lower back stiffness.  No oral or nasal sores.  No rash.    Tobacco: Chewing tobacco  EtOH: 1-2 drinks every 2 weeks  Drugs: None  Occupation:  for [employer]   12 point review of system was completed and negative except as noted in the HPI     Active Problem List     Patient Active Problem List   Diagnosis     HLA-B27 associated acute anterior uveitis     Past Medical History   History reviewed. No pertinent past medical history.  Past Surgical History   History reviewed. No pertinent surgical history.  Allergy   No Known Allergies  Current Medication List     Current Outpatient Medications   Medication Sig     leflunomide (ARAVA) 10 MG tablet Take 1 tablet (10 mg) by mouth daily     lisinopril (ZESTRIL) 10 MG tablet      sulfaSALAzine ER (AZULFIDINE EN) 500 MG EC tablet Take 2 tablets (1,000 mg) by mouth 2 times daily     No current facility-administered medications for this visit.          Social History   See HPI    Family History     Denies family history of rheumatologic disease    Physical Exam     Temp Readings from Last 3 Encounters:   12/17/18 98  F (36.7  C) (Oral)     BP Readings from Last 5 Encounters:   11/22/19 130/88   08/02/19 134/80   05/10/19 136/88   12/17/18 (!) 156/98   08/06/18 (!) 144/91     Pulse Readings from Last 1 Encounters:   11/22/19 89     Resp  Readings from Last 1 Encounters:   08/06/18 16     There is no height or weight on file to calculate BMI.    GEN: alert and no distress  PSYCH: Alert; coherent speech, normal rate and volume, able to articulate logical thoughts, able   to abstract reason, no tangential thoughts. Normal affect.   RESP: No cough, no audible wheezing, able to talk in full sentences  Remainder of exam unable to be completed due to telephone visits     Labs / Imaging (select studies)   [facility] labs on 12/3/2012: HLA-B27 positive    RF/CCP  Recent Labs   Lab Test 08/06/18  0929   CCPIGG 1   RHF <20     CBC  Recent Labs   Lab Test 02/08/21  1056 10/19/20  1055 07/07/20  1350 04/10/20  1048   WBC 4.6 5.0 4.9 4.6   RBC 5.42 5.43 5.04 5.29   HGB 16.1 16.0 14.8 15.6   HCT 47.2 46.7 43.2 46.1   MCV 87 86 86 87   RDW 13.3 12.9 12.9 13.4    265 295 270   MCH 29.7 29.5 29.4 29.5   MCHC 34.1 34.3 34.3 33.8   NEUTROPHIL 52.3 56.6 45.0 53.4   LYMPH 22.2 22.2 22.0 23.8   MONOCYTE 19.3 17.8 30.0 18.4   EOSINOPHIL 5.3 2.6 3.0 3.5   BASOPHIL 0.9 0.8  --  0.9   ANEU 2.4 2.8 2.2 2.5   ALYM 1.0 1.1 1.1 1.1   AURORA 0.9 0.9 1.5* 0.9   AEOS 0.2 0.1 0.1 0.2   ABAS 0.0 0.0  --  0.0     CMP  Recent Labs   Lab Test 02/08/21  1056 10/19/20  1055 07/07/20  1350 08/06/18  0929 08/06/18  0929   NA  --   --   --   --  137   POTASSIUM  --   --   --   --  4.7   CHLORIDE  --   --   --   --  104   CO2  --   --   --   --  25   ANIONGAP  --   --   --   --  8   GLC  --   --   --   --  84   BUN  --   --   --   --  14   CR 1.04 0.88 0.89   < > 0.82   GFRESTIMATED 82 >90 >90   < > >90   GFRESTBLACK >90 >90 >90   < > >90   NICOLA  --   --   --   --  8.8   BILITOTAL 0.9 1.4* 0.8   < > 0.5   ALBUMIN 3.8 4.0 3.7   < > 3.8   PROTTOTAL 7.3 7.8 7.3   < > 7.7   ALKPHOS 49 55 48   < > 70   AST 29 28 24   < > 24   ALT 37 40 38   < > 41    < > = values in this interval not displayed.     Calcium/VitaminD  Recent Labs   Lab Test 08/06/18  0929   NICOLA 8.8     ESR/CRP  Recent Labs    Lab Test 10/19/20  1055 04/10/20  1048 10/11/19  0953   SED 4 4 4   CRP <2.9 <2.9 <2.9     Hepatitis B  Recent Labs   Lab Test 08/06/18  0929   HBCAB Nonreactive   HEPBANG Nonreactive     Hepatitis C  Recent Labs   Lab Test 08/06/18  0929   HCVAB Nonreactive     Lyme ab screening  Recent Labs   Lab Test 08/06/18  0929   LYMEGM 0.06     Tuberculosis Screening  Recent Labs   Lab Test 08/06/18  0929   TBRES Negative     Immunization History     Immunization History   Administered Date(s) Administered     Pneumo Conj 13-V (2010&after) 12/17/2018     Pneumococcal 23 valent 05/10/2019          Chart documentation done in part with Dragon Voice recognition Software. Although reviewed after completion, some word and grammatical error may remain.    Phone call duration with patient (in minutes): 5    Location of patient: [place], [place]   Location of provider: hood Bunn [place]

## 2021-09-18 ENCOUNTER — HEALTH MAINTENANCE LETTER (OUTPATIENT)
Age: 51
End: 2021-09-18

## 2021-10-07 NOTE — PROGRESS NOTES
Bry Bingham  is a 51 year old year old male who is being evaluated via a billable video visit.      How would you like to obtain your AVS? MyChart  If the video visit is dropped, the invitation should be resent by: Text to cell phone: [phone]  Will anyone else be joining your video visit? No    Rheumatology Video Visit      Bry Bingham MRN# [id number]   YOB: 1970 Age: 51 year old      Date of visit: 10/08/21   Ophthalmologist: Dr. Luis Eduardo Young from [facility]   PCP: Hood Melo at [facility]    Chief Complaint   Patient presents with:  Inflammatory polyarthropathy    Assessment and Plan     1.  HLA-B27 associated recurrent left anterior uveitis: Acute onset per ophthalmologist.  HLA-B27 positive.  History of rheumatoid arthritis per patient report but no active synovitis seen on initial exam.   Sudden onset unilateral anterior uveitis was suggestive of an HLA-B27 association.  Reportedly had uveitis since 2009; was tx'd with MTX successfully for many years but he stopped it because of associated fatigue; did well off tx for 3-4 years until the uveitis flared and required steroid eye drops twice daily only partially controlled the left eye symptoms.    Leflunomide 20 mg daily was started with good benefit but liver enzymes elevated so it was reduced to leflunomide 10 mg daily.  He then presented with symmetric synovitis of the MCPs, so sulfasalazine was added with resolution of his inflammatory arthritis.  Currently on leflunomide 10mg daily and sulfasalazine 1000 mg twice daily.  Doing well with good control of arthritis and uveitis.  Toxicity monitoring labs are overdue; toxicity monitoring labs are required for medication refills.  Chronic illness, stable.      - Continue sulfasalazine 1000mg BID if labs are okay.  Refill by MD only  - Continue leflunomide 10mg daily if labs are okay.  Refill by MD only  - Labs now and every 8-12 weeks: CBC, Creatinine, Hepatic  Panel, ESR, CRP    High risk medication requiring intensive toxicity monitoring at least quarterly: labs ordered include CBC, Creatinine, Hepatic panel to monitor for cytopenia and hepatotoxicity; checking creatinine as it affects clearance of medication.      2. 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 See #1.   Chronic illness, progressive.      3. Chewing tobacco: Advised complete cessation     4.  Vaccinations: Vaccinations reviewed with Mr. Bingham.  Risks and benefits of vaccinations were discussed.    - Influenza: encouraged yearly vaccination  - [id number]: up to date  - [id number]: up to date  - Shingrix: up to date  - COVID-19: has received the Pfizer COVID-19 vaccine on 3/21/2021 and 4/11/2021    A single additional dose of the Pfizer or Moderna COVID-19 vaccine is recommended at least 28 days after the completion of the 2-dose mRNA vaccine series for patients receiving any immunosuppressive or immunomodulatory therapy. Attempts should be made to match the additional mRNA dose type to the type given in the mRNA primary series; however, if that is not feasible, a booster dose with the alternative mRNA vaccine is permitted.    Based on our current understanding (and this may change over time as we learn more), medications should be adjusted as noted below, if disease activity allows:  - NSAIDs and Acetaminophen: hold for 24 hours prior to vaccination if able to do so  - Sulfasalazine should be held for 1 week after the COVID19 booster vaccine  - Leflunomide should be held for 1 week after the COVID19 booster vaccine    Total minutes spent in evaluation with patient, documentation, , and review of pertinent studies and chart notes: 16      Mr. Bingham verbalized agreement with and understanding of the rational for the diagnosis and treatment plan.  All questions were answered to best of my ability and the patient's satisfaction. Mr. Bingham was advised to contact the clinic with any  questions that may arise after the clinic visit.      Thank you for involving me in the care of the patient    Return to clinic: 6 months    HPI   Bry Bingham is a 51 year old male with a past medical history significant for iritis and rheumatoid arthritis who is seen for follow-up of recurrent left acute anterior uveitis.    Today, 10/8/2021: Reports doing well with no joint pain.  Morning stiffness for about 5 minutes.   strength is normal.  Arthritis does not prevent him from doing any of his daily activities.  Tolerating sulfasalazine and leflunomide well.  Has not had labs since April so that he will do those ASAP.  No inflammatory eye disease symptoms at this time, and says that it has been well controlled on sulfasalazine and leflunomide.  No eye pain or redness.    Denies fevers, chills, nausea, vomiting, constipation, diarrhea. No abdominal pain. No chest pain/pressure, palpitations, or shortness of breath. No LE swelling. No neck pain.  No lower back pain or lower back stiffness.  No oral or nasal sores.  No rash.    Tobacco: Chewing tobacco  EtOH: 1-2 drinks every 2 weeks  Drugs: None  Occupation:  for [employer]   12 point review of system was completed and negative except as noted in the HPI     Active Problem List     Patient Active Problem List   Diagnosis     HLA-B27 associated acute anterior uveitis     Past Medical History   History reviewed. No pertinent past medical history.  Past Surgical History   History reviewed. No pertinent surgical history.  Allergy   No Known Allergies  Current Medication List     Current Outpatient Medications   Medication Sig     leflunomide (ARAVA) 10 MG tablet Take 1 tablet (10 mg) by mouth daily     lisinopril (ZESTRIL) 10 MG tablet      sulfaSALAzine ER (AZULFIDINE EN) 500 MG EC tablet Take 2 tablets (1,000 mg) by mouth 2 times daily     No current facility-administered medications for this visit.         Social History   See HPI    Family  History     Denies family history of rheumatologic disease    Physical Exam     Temp Readings from Last 3 Encounters:   12/17/18 98  F (36.7  C) (Oral)     BP Readings from Last 5 Encounters:   11/22/19 130/88   08/02/19 134/80   05/10/19 136/88   12/17/18 (!) 156/98   08/06/18 (!) 144/91     Pulse Readings from Last 1 Encounters:   11/22/19 89     Resp Readings from Last 1 Encounters:   08/06/18 16     There is no height or weight on file to calculate BMI.        GEN: NAD. Healthy appearing adult.   HEENT: MMM.  Anicteric, noninjected sclera. No obvious external lesions of the ear and nose. Hearing intact.  PULM: No increased work of breathing  MSK:  Hands and wrists without swelling.  Able to make a complete fist with each hand.  SKIN: No rash or jaundice seen  PSYCH: Alert. Appropriate.        Labs / Imaging (select studies)   [facility] labs on 12/3/2012: HLA-B27 positive      RF/CCP  Recent Labs   Lab Test 08/06/18  0929   CCPIGG 1   RHF <20     CBC  Recent Labs   Lab Test 02/08/21  1056 10/19/20  1055 07/07/20  1350 04/10/20  1048 04/10/20  1048   WBC 4.6 5.0 4.9   < > 4.6   RBC 5.42 5.43 5.04   < > 5.29   HGB 16.1 16.0 14.8   < > 15.6   HCT 47.2 46.7 43.2   < > 46.1   MCV 87 86 86   < > 87   RDW 13.3 12.9 12.9   < > 13.4    265 295   < > 270   MCH 29.7 29.5 29.4   < > 29.5   MCHC 34.1 34.3 34.3   < > 33.8   NEUTROPHIL 52.3 56.6 45.0   < > 53.4   LYMPH 22.2 22.2 22.0   < > 23.8   MONOCYTE 19.3 17.8 30.0   < > 18.4   EOSINOPHIL 5.3 2.6 3.0   < > 3.5   BASOPHIL 0.9 0.8  --   --  0.9   ANEU 2.4 2.8 2.2   < > 2.5   ALYM 1.0 1.1 1.1   < > 1.1   AURORA 0.9 0.9 1.5*   < > 0.9   AEOS 0.2 0.1 0.1   < > 0.2   ABAS 0.0 0.0  --   --  0.0    < > = values in this interval not displayed.     CMP  Recent Labs   Lab Test 02/08/21  1056 10/19/20  1055 07/07/20  1350 09/10/18  1101 08/06/18  0929   NA  --   --   --   --  137   POTASSIUM  --   --   --   --  4.7   CHLORIDE  --   --   --   --  104   CO2  --   --   --   --   25   ANIONGAP  --   --   --   --  8   GLC  --   --   --   --  84   BUN  --   --   --   --  14   CR 1.04 0.88 0.89   < > 0.82   GFRESTIMATED 82 >90 >90   < > >90   GFRESTBLACK >90 >90 >90   < > >90   NICOLA  --   --   --   --  8.8   BILITOTAL 0.9 1.4* 0.8   < > 0.5   ALBUMIN 3.8 4.0 3.7   < > 3.8   PROTTOTAL 7.3 7.8 7.3   < > 7.7   ALKPHOS 49 55 48   < > 70   AST 29 28 24   < > 24   ALT 37 40 38   < > 41    < > = values in this interval not displayed.     Calcium/VitaminD  Recent Labs   Lab Test 08/06/18  0929   NICOLA 8.8     ESR/CRP  Recent Labs   Lab Test 10/19/20  1055 04/10/20  1048 10/11/19  0953   SED 4 4 4   CRP <2.9 <2.9 <2.9     Hepatitis B  Recent Labs   Lab Test 08/06/18  0929   HBCAB Nonreactive   HEPBANG Nonreactive     Hepatitis C  Recent Labs   Lab Test 08/06/18  0929   HCVAB Nonreactive     Lyme ab screening  Recent Labs   Lab Test 08/06/18  0929   LYMEGM 0.06     Tuberculosis Screening  Recent Labs   Lab Test 08/06/18  0929   TBRES Negative         Immunization History     Immunization History   Administered Date(s) Administered     COVID-19,PF,Pfizer 03/21/2021, 04/11/2021     Pneumo Conj 13-V (2010&after) 12/17/2018     Pneumococcal 23 valent 05/10/2019          Chart documentation done in part with Dragon Voice recognition Software. Although reviewed after completion, some word and grammatical error may remain.      Video-Visit Details    Type of service:  Video Visit    Video Start Time: 11:10 AM    Video End Time:11:23 AM    Originating Location (pt. Location): [place], [place]    Distant Location (provider location):  Home    Platform used for Video Visit: Leroy Bunn MD

## 2021-10-07 NOTE — PATIENT INSTRUCTIONS
RHEUMATOLOGY    Dr. Charles Bunn    [facility]  [address] [place] [address]  Phone number: [phone]  Fax number: [phone]    Thank you for choosing [facility]!    Theresa Clay CMA Rheumatology    -----------------------    A single additional dose of the Pfizer or Moderna COVID-19 vaccine is recommended at least 28 days after the completion of the 2-dose mRNA vaccine series for patients receiving any immunosuppressive or immunomodulatory therapy. Attempts should be made to match the additional mRNA dose type to the type given in the mRNA primary series; however, if that is not feasible, a booster dose with the alternative mRNA vaccine is permitted.    Based on our current understanding (and this may change over time as we learn more), medications should be adjusted as noted below, if disease activity allows:  - NSAIDs and Acetaminophen: hold for 24 hours prior to vaccination if able to do so  - Sulfasalazine should be held for 1 week after the COVID19 booster vaccine  - Leflunomide should be held for 1 week after the COVID19 booster vaccine

## 2021-10-08 ENCOUNTER — VIRTUAL VISIT (OUTPATIENT)
Dept: RHEUMATOLOGY | Facility: CLINIC | Age: 51
End: 2021-10-08
Payer: COMMERCIAL

## 2021-10-08 DIAGNOSIS — M06.4 INFLAMMATORY POLYARTHROPATHY (H): ICD-10-CM

## 2021-10-08 DIAGNOSIS — Z79.899 HIGH RISK MEDICATIONS (NOT ANTICOAGULANTS) LONG-TERM USE: ICD-10-CM

## 2021-10-08 DIAGNOSIS — H20.00 HLA-B27 ASSOCIATED ACUTE ANTERIOR UVEITIS: Primary | ICD-10-CM

## 2021-10-08 PROCEDURE — 99214 OFFICE O/P EST MOD 30 MIN: CPT | Mod: 95 | Performed by: INTERNAL MEDICINE

## 2021-10-20 ENCOUNTER — LAB (OUTPATIENT)
Dept: LAB | Facility: CLINIC | Age: 51
End: 2021-10-20
Payer: COMMERCIAL

## 2021-10-20 DIAGNOSIS — Z79.899 HIGH RISK MEDICATIONS (NOT ANTICOAGULANTS) LONG-TERM USE: ICD-10-CM

## 2021-10-20 DIAGNOSIS — H20.00 HLA-B27 ASSOCIATED ACUTE ANTERIOR UVEITIS: ICD-10-CM

## 2021-10-20 DIAGNOSIS — M06.4 INFLAMMATORY POLYARTHROPATHY (H): ICD-10-CM

## 2021-10-20 LAB
ALBUMIN SERPL-MCNC: 3.5 G/DL (ref 3.4–5)
ALP SERPL-CCNC: 58 U/L (ref 40–150)
ALT SERPL W P-5'-P-CCNC: 49 U/L (ref 0–70)
AST SERPL W P-5'-P-CCNC: 35 U/L (ref 0–45)
BASOPHILS # BLD MANUAL: 0.1 10E3/UL (ref 0–0.2)
BASOPHILS NFR BLD MANUAL: 1 %
BILIRUB DIRECT SERPL-MCNC: 0.1 MG/DL (ref 0–0.2)
BILIRUB SERPL-MCNC: 0.5 MG/DL (ref 0.2–1.3)
CREAT SERPL-MCNC: 0.95 MG/DL (ref 0.66–1.25)
CRP SERPL-MCNC: 7.4 MG/L (ref 0–8)
EOSINOPHIL # BLD MANUAL: 0.1 10E3/UL (ref 0–0.7)
EOSINOPHIL NFR BLD MANUAL: 2 %
ERYTHROCYTE [DISTWIDTH] IN BLOOD BY AUTOMATED COUNT: 12 % (ref 10–15)
ERYTHROCYTE [SEDIMENTATION RATE] IN BLOOD BY WESTERGREN METHOD: 7 MM/HR (ref 0–20)
GFR SERPL CREATININE-BSD FRML MDRD: >90 ML/MIN/1.73M2
HCT VFR BLD AUTO: 42.6 % (ref 40–53)
HGB BLD-MCNC: 14.5 G/DL (ref 13.3–17.7)
LYMPHOCYTES # BLD MANUAL: 1 10E3/UL (ref 0.8–5.3)
LYMPHOCYTES NFR BLD MANUAL: 18 %
MCH RBC QN AUTO: 29.8 PG (ref 26.5–33)
MCHC RBC AUTO-ENTMCNC: 34 G/DL (ref 31.5–36.5)
MCV RBC AUTO: 88 FL (ref 78–100)
MONOCYTES # BLD MANUAL: 1.3 10E3/UL (ref 0–1.3)
MONOCYTES NFR BLD MANUAL: 23 %
NEUTROPHILS # BLD MANUAL: 3.1 10E3/UL (ref 1.6–8.3)
NEUTROPHILS NFR BLD MANUAL: 56 %
PLAT MORPH BLD: ABNORMAL
PLATELET # BLD AUTO: 311 10E3/UL (ref 150–450)
PROT SERPL-MCNC: 7 G/DL (ref 6.8–8.8)
RBC # BLD AUTO: 4.87 10E6/UL (ref 4.4–5.9)
RBC MORPH BLD: ABNORMAL
WBC # BLD AUTO: 5.6 10E3/UL (ref 4–11)

## 2021-10-20 PROCEDURE — 86140 C-REACTIVE PROTEIN: CPT

## 2021-10-20 PROCEDURE — 80076 HEPATIC FUNCTION PANEL: CPT

## 2021-10-20 PROCEDURE — 85652 RBC SED RATE AUTOMATED: CPT

## 2021-10-20 PROCEDURE — 36415 COLL VENOUS BLD VENIPUNCTURE: CPT

## 2021-10-20 PROCEDURE — 85027 COMPLETE CBC AUTOMATED: CPT

## 2021-10-20 PROCEDURE — 82565 ASSAY OF CREATININE: CPT

## 2022-01-08 ENCOUNTER — HEALTH MAINTENANCE LETTER (OUTPATIENT)
Age: 52
End: 2022-01-08

## 2022-02-18 RX ORDER — LISINOPRIL 10 MG/1
TABLET ORAL
Status: CANCELLED | OUTPATIENT
Start: 2022-02-18

## 2022-02-18 NOTE — TELEPHONE ENCOUNTER
Medication:   Leflunomide 10 mg  Last written on:   4/9/2021  Quantity:   90    Refills:   1    Last office visit:   10/8/2021  Next office visit:   4/8/2022  Last labs:   10/20/2021

## 2022-02-21 NOTE — TELEPHONE ENCOUNTER
RN: Please call to notify Bry Bingham that leflunomide has not been refilled.  Toxicity monitoring labs are due now, and are required to safely continue leflunomide.  Please resubmit the refill request after labs are completed.    Charles Bunn MD  2/21/2022 1:07 PM

## 2022-02-24 NOTE — TELEPHONE ENCOUNTER
Patient sent message stating he will get labs done.    Edouard SHEPHERD RN....2/24/2022 10:08 AM

## 2022-02-24 NOTE — TELEPHONE ENCOUNTER
Left message for patient to return call to clinic.  Also sent patient a RECUPYLhart message.    Edouard Prater RN....2/24/2022 9:35 AM

## 2022-03-07 ENCOUNTER — LAB (OUTPATIENT)
Dept: LAB | Facility: OTHER | Age: 52
End: 2022-03-07
Payer: COMMERCIAL

## 2022-03-07 DIAGNOSIS — M06.4 INFLAMMATORY POLYARTHROPATHY (H): ICD-10-CM

## 2022-03-07 DIAGNOSIS — Z79.899 HIGH RISK MEDICATIONS (NOT ANTICOAGULANTS) LONG-TERM USE: ICD-10-CM

## 2022-03-07 DIAGNOSIS — H20.00 HLA-B27 ASSOCIATED ACUTE ANTERIOR UVEITIS: ICD-10-CM

## 2022-03-07 LAB
ALBUMIN SERPL-MCNC: 3.8 G/DL (ref 3.4–5)
ALP SERPL-CCNC: 50 U/L (ref 40–150)
ALT SERPL W P-5'-P-CCNC: 33 U/L (ref 0–70)
AST SERPL W P-5'-P-CCNC: 26 U/L (ref 0–45)
BASOPHILS # BLD AUTO: 0.1 10E3/UL (ref 0–0.2)
BASOPHILS NFR BLD AUTO: 1 %
BILIRUB DIRECT SERPL-MCNC: 0.1 MG/DL (ref 0–0.2)
BILIRUB SERPL-MCNC: 0.5 MG/DL (ref 0.2–1.3)
CREAT SERPL-MCNC: 0.82 MG/DL (ref 0.66–1.25)
CRP SERPL-MCNC: <2.9 MG/L (ref 0–8)
EOSINOPHIL # BLD AUTO: 0.3 10E3/UL (ref 0–0.7)
EOSINOPHIL NFR BLD AUTO: 6 %
ERYTHROCYTE [DISTWIDTH] IN BLOOD BY AUTOMATED COUNT: 12.9 % (ref 10–15)
ERYTHROCYTE [SEDIMENTATION RATE] IN BLOOD BY WESTERGREN METHOD: 4 MM/HR (ref 0–20)
GFR SERPL CREATININE-BSD FRML MDRD: >90 ML/MIN/1.73M2
HCT VFR BLD AUTO: 46 % (ref 40–53)
HGB BLD-MCNC: 15.6 G/DL (ref 13.3–17.7)
LYMPHOCYTES # BLD AUTO: 1 10E3/UL (ref 0.8–5.3)
LYMPHOCYTES NFR BLD AUTO: 21 %
MCH RBC QN AUTO: 29.3 PG (ref 26.5–33)
MCHC RBC AUTO-ENTMCNC: 33.9 G/DL (ref 31.5–36.5)
MCV RBC AUTO: 87 FL (ref 78–100)
MONOCYTES # BLD AUTO: 0.9 10E3/UL (ref 0–1.3)
MONOCYTES NFR BLD AUTO: 19 %
NEUTROPHILS # BLD AUTO: 2.6 10E3/UL (ref 1.6–8.3)
NEUTROPHILS NFR BLD AUTO: 53 %
PLATELET # BLD AUTO: 317 10E3/UL (ref 150–450)
PROT SERPL-MCNC: 7.4 G/DL (ref 6.8–8.8)
RBC # BLD AUTO: 5.32 10E6/UL (ref 4.4–5.9)
WBC # BLD AUTO: 4.8 10E3/UL (ref 4–11)

## 2022-03-07 PROCEDURE — 86140 C-REACTIVE PROTEIN: CPT

## 2022-03-07 PROCEDURE — 85025 COMPLETE CBC W/AUTO DIFF WBC: CPT

## 2022-03-07 PROCEDURE — 82565 ASSAY OF CREATININE: CPT

## 2022-03-07 PROCEDURE — 80076 HEPATIC FUNCTION PANEL: CPT

## 2022-03-07 PROCEDURE — 36415 COLL VENOUS BLD VENIPUNCTURE: CPT

## 2022-03-07 PROCEDURE — 85652 RBC SED RATE AUTOMATED: CPT

## 2022-04-18 ENCOUNTER — OFFICE VISIT (OUTPATIENT)
Dept: RHEUMATOLOGY | Facility: CLINIC | Age: 52
End: 2022-04-18
Payer: COMMERCIAL

## 2022-04-18 VITALS
WEIGHT: 186.8 LBS | DIASTOLIC BLOOD PRESSURE: 86 MMHG | SYSTOLIC BLOOD PRESSURE: 130 MMHG | OXYGEN SATURATION: 98 % | HEART RATE: 87 BPM

## 2022-04-18 DIAGNOSIS — M06.4 INFLAMMATORY POLYARTHROPATHY (H): ICD-10-CM

## 2022-04-18 DIAGNOSIS — H20.00 HLA-B27 ASSOCIATED ACUTE ANTERIOR UVEITIS: Primary | ICD-10-CM

## 2022-04-18 DIAGNOSIS — Z79.899 HIGH RISK MEDICATIONS (NOT ANTICOAGULANTS) LONG-TERM USE: ICD-10-CM

## 2022-04-18 PROCEDURE — 99214 OFFICE O/P EST MOD 30 MIN: CPT | Performed by: INTERNAL MEDICINE

## 2022-04-18 RX ORDER — LEFLUNOMIDE 10 MG/1
10 TABLET ORAL DAILY
Qty: 90 TABLET | Refills: 0 | Status: SHIPPED | OUTPATIENT
Start: 2022-04-18 | End: 2022-07-27

## 2022-04-18 RX ORDER — SULFASALAZINE 500 MG/1
1000 TABLET, DELAYED RELEASE ORAL 2 TIMES DAILY
Qty: 360 TABLET | Refills: 0 | Status: SHIPPED | OUTPATIENT
Start: 2022-04-18 | End: 2022-07-27

## 2022-04-18 NOTE — PATIENT INSTRUCTIONS
RHEUMATOLOGY    Dr. Charles Bunn    [facility]  [address] [place] [address] [place] [address]  Phone number: [phone]  Fax number: [phone]      Thank you for choosing [facility]!    Theresa Clay CMA Rheumatology      ------------------------    A 4th dose (1st booster) of the mRNA COVID-19 vaccination is recommended to be received 3 months after the third mRNA COVID-19 vaccination was received.  A 5th mRNA vaccine (2nd booster) may be received at least 4 months after the 4th dose.   Based on our current understanding (and this may change over time as we learn more), medications should be adjusted as noted below, if disease activity allows:  - NSAIDs and Acetaminophen: hold for 24 hours prior to vaccination if able to do so  - Sulfasalazine should be held for 1-2 weeks after each COVID-19 vaccine (as disease activity allows)  - Leflunomide should be held for 1-2 weeks after each COVID-19 vaccine (as disease activity allows)

## 2022-04-18 NOTE — PROGRESS NOTES
Rheumatology Clinic Visit      Bry Bingham MRN# [id number]   YOB: 1970 Age: 52 year old      Date of visit: 4/18/22   Ophthalmologist: Dr. Luis Eduardo Young from [facility]   PCP: Hood Melo at [facility]    Chief Complaint   Patient presents with:  HLA-B27 associated recurrent left anterior uveitis:    Assessment and Plan     1.  HLA-B27 associated recurrent left anterior uveitis: Acute onset per ophthalmologist.  HLA-B27 positive.  History of rheumatoid arthritis per patient report but no active synovitis seen on initial exam.   Sudden onset unilateral anterior uveitis was suggestive of an HLA-B27 association.  Reportedly had uveitis since 2009; was tx'd with MTX successfully for many years but he stopped it because of associated fatigue; did well off tx for 3-4 years until the uveitis flared and required steroid eye drops twice daily only partially controlled the left eye symptoms.    Leflunomide 20 mg daily was started with good benefit but liver enzymes elevated so it was reduced to leflunomide 10 mg daily.  He then presented with symmetric synovitis of the MCPs, so sulfasalazine was added with resolution of his inflammatory arthritis.  Currently on leflunomide 10mg daily and sulfasalazine 1000 mg twice daily.  Doing well with good control of arthritis and uveitis.  Toxicity monitoring lab noncompliance; stressed the importance of toxicity monitoring labs.   Chronic illness, stable.    - Continue sulfasalazine 1000mg BID   Refill by MD only  - Continue leflunomide 10mg daily  Refill by MD only  - Labs in May 2022: CBC, Creatinine, Hepatic Panel (labs to be done here in May)  - Labs in mid-Aug 2022: CBC, Creatinine, Hepatic Panel, ESR, CRP (orders given to Bry Otilia and a copy faxed to [facility] in [place], [place] at his request)    High risk medication requiring intensive toxicity monitoring at least quarterly: labs ordered include CBC, Creatinine, Hepatic  panel to monitor for cytopenia and hepatotoxicity; checking creatinine as it affects clearance of medication.      2. 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 See #1.  Controlled on sulfasalazine and leflunomide.  Chronic illness, stable.      3. Chewing tobacco: Advised complete cessation     4.  Right fourth DIP bony hypertrophy: Reportedly had trauma of slamming it in a door at work in approximately December 2021 and was evaluated by his company's healthcare provider at that time.  Bony hypertrophy but no tenderness to palpation, increased warmth, or overlying erythema.  Discussed the option of x-ray for further evaluation but he says that it is not bothering him and is not affecting function so he does not want to have any additional work-up at this time.  If it starts to bother him then he may follow-up here or see his primary care provider for sooner evaluation.      5.  Vaccinations: Vaccinations reviewed with Mr. Bingham.  Risks and benefits of vaccinations were discussed.    - Influenza: encouraged yearly vaccination  - [id number]: up to date  - [id number]: up to date  - Shingrix: up to date  - COVID-19: has received the Pfizer COVID-19 vaccine on 3/21/2021 and 4/11/2021 and 10/18/2021. A 4th dose (1st booster) of the mRNA COVID-19 vaccination is recommended to be received 3 months after the third mRNA COVID-19 vaccination was received.  A 5th mRNA vaccine (2nd booster) may be received at least 4 months after the 4th dose.     Based on our current understanding (and this may change over time as we learn more), medications should be adjusted as noted below, if disease activity allows:  - NSAIDs and Acetaminophen: hold for 24 hours prior to vaccination if able to do so  - Sulfasalazine should be held for 1-2 weeks after each COVID-19 vaccine (as disease activity allows)  - Leflunomide should be held for 1-2 weeks after each COVID-19 vaccine (as disease activity allows)    Total minutes spent in  evaluation with patient, documentation, , and review of pertinent studies and chart notes: 21    Mr. Bingham verbalized agreement with and understanding of the rational for the diagnosis and treatment plan.  All questions were answered to best of my ability and the patient's satisfaction. Mr. Bingham was advised to contact the clinic with any questions that may arise after the clinic visit.      Thank you for involving me in the care of the patient    Return to clinic: 6 months    HPI   Bry Bingham is a 52 year old male with a past medical history significant for iritis and rheumatoid arthritis who is seen for follow-up of recurrent left acute anterior uveitis.    Today, 4/18/2022: Currently doing well.  No joint pain or swelling.  4 months ago had trauma to the right fourth DIP and now he has bony hypertrophy there; he does not want to do anything for it because it it does not affect function he says and it does not bother him.  Morning stiffness for about 5 minutes.  No eye pain or redness.  No blurry vision.  Tolerating medications well.  Planning to move to [place], [place] in summer 2022, but would like to continue following here as he will be visiting his daughter who will be starting at [employer] in the fall.     Denies fevers, chills, nausea, vomiting, constipation, diarrhea. No abdominal pain. No chest pain/pressure, palpitations, or shortness of breath. No LE swelling. No neck pain.  No lower back pain or lower back stiffness.  No oral or nasal sores.  No rash.    Tobacco: Chewing tobacco  EtOH: 1-2 drinks every 2 weeks  Drugs: None  Occupation:  for [employer]   12 point review of system was completed and negative except as noted in the HPI     Active Problem List     Patient Active Problem List   Diagnosis     HLA-B27 associated acute anterior uveitis     Past Medical History   History reviewed. No pertinent past medical history.  Past Surgical History   History  reviewed. No pertinent surgical history.  Allergy   No Known Allergies  Current Medication List     Current Outpatient Medications   Medication Sig     leflunomide (ARAVA) 10 MG tablet Take 1 tablet (10 mg) by mouth daily     lisinopril (ZESTRIL) 10 MG tablet      sulfaSALAzine ER (AZULFIDINE EN) 500 MG EC tablet Take 2 tablets (1,000 mg) by mouth 2 times daily     No current facility-administered medications for this visit.         Social History   See HPI    Family History     Denies family history of rheumatologic disease    Physical Exam     Temp Readings from Last 3 Encounters:   12/17/18 98  F (36.7  C) (Oral)     BP Readings from Last 5 Encounters:   04/18/22 (!) 137/95   11/22/19 130/88   08/02/19 134/80   05/10/19 136/88   12/17/18 (!) 156/98     Pulse Readings from Last 1 Encounters:   04/18/22 87     Resp Readings from Last 1 Encounters:   08/06/18 16     There is no height or weight on file to calculate BMI.      GEN: NAD. Healthy appearing adult.   HEENT:  Anicteric, noninjected sclera. No obvious external lesions of the ear and nose. Hearing intact.  PULM: No increased work of breathing.   MSK: MCPs, PIPs, DIPs without swelling or tenderness to palpation.  Limited range of motion of the right fourth DIP.  Wrists without swelling or tenderness to palpation.  Elbows and shoulders without swelling or tenderness to palpation. Knees, ankles, and MTPs without swelling or tenderness to palpation.    SKIN: No rash or jaundice seen  PSYCH: Alert. Appropriate.        Labs / Imaging (select studies)   [facility] labs on 12/3/2012: HLA-B27 positive    RF/CCP  Recent Labs   Lab Test 08/06/18  0929   CCPIGG 1   RHF <20     CBC  Recent Labs   Lab Test 03/07/22  0956 10/20/21  1539 02/08/21  1056 10/19/20  1055   WBC 4.8 5.6 4.6 5.0   RBC 5.32 4.87 5.42 5.43   HGB 15.6 14.5 16.1 16.0   HCT 46.0 42.6 47.2 46.7   MCV 87 88 87 86   RDW 12.9 12.0 13.3 12.9    311 269 265   MCH 29.3 29.8 29.7 29.5   MCHC 33.9  34.0 34.1 34.3   NEUTROPHIL 53 56 52.3 56.6   LYMPH 21 18 22.2 22.2   MONOCYTE 19 23 19.3 17.8   EOSINOPHIL 6 2 5.3 2.6   BASOPHIL 1 1 0.9 0.8   ANEU  --  3.1 2.4 2.8   ALYM  --  1.0 1.0 1.1   AURORA  --  1.3 0.9 0.9   AEOS  --  0.1 0.2 0.1   ABAS  --  0.1 0.0 0.0   ANEUTAUTO 2.6  --   --   --    ALYMPAUTO 1.0  --   --   --    AMONOAUTO 0.9  --   --   --    AEOSAUTO 0.3  --   --   --    ABSBASO 0.1  --   --   --      CMP  Recent Labs   Lab Test 03/07/22  0956 10/20/21  1539 02/08/21  1056 10/19/20  1055 07/07/20  1350 09/10/18  1101 08/06/18  0929   NA  --   --   --   --   --   --  137   POTASSIUM  --   --   --   --   --   --  4.7   CHLORIDE  --   --   --   --   --   --  104   CO2  --   --   --   --   --   --  25   ANIONGAP  --   --   --   --   --   --  8   GLC  --   --   --   --   --   --  84   BUN  --   --   --   --   --   --  14   CR 0.82 0.95 1.04 0.88 0.89   < > 0.82   GFRESTIMATED >90 >90 82 >90 >90   < > >90   GFRESTBLACK  --   --  >90 >90 >90   < > >90   NICOLA  --   --   --   --   --   --  8.8   BILITOTAL 0.5 0.5 0.9 1.4* 0.8   < > 0.5   ALBUMIN 3.8 3.5 3.8 4.0 3.7   < > 3.8   PROTTOTAL 7.4 7.0 7.3 7.8 7.3   < > 7.7   ALKPHOS 50 58 49 55 48   < > 70   AST 26 35 29 28 24   < > 24   ALT 33 49 37 40 38   < > 41    < > = values in this interval not displayed.     Calcium/VitaminD  Recent Labs   Lab Test 08/06/18  0929   NICOLA 8.8     ESR/CRP  Recent Labs   Lab Test 03/07/22  0956 10/20/21  1539 10/19/20  1055   SED 4 7 4   CRP <2.9 7.4 <2.9     Hepatitis B  Recent Labs   Lab Test 08/06/18  0929   HBCAB Nonreactive   HEPBANG Nonreactive     Hepatitis C  Recent Labs   Lab Test 08/06/18  0929   HCVAB Nonreactive     Lyme ab screening  Recent Labs   Lab Test 08/06/18  0929   LYMEGM 0.06       Tuberculosis Screening  Recent Labs   Lab Test 08/06/18  0929   TBRES Negative     Immunization History     Immunization History   Administered Date(s) Administered     COVID-19,PF,Pfizer (12+ Yrs) 03/21/2021, 04/11/2021, 10/18/2021      Pneumo Conj 13-V (2010&after) 12/17/2018     Pneumococcal 23 valent 05/10/2019          Chart documentation done in part with Dragon Voice recognition Software. Although reviewed after completion, some word and grammatical error may remain.        Charles Bunn MD

## 2022-04-18 NOTE — NURSING NOTE
Blood pressure rechecked after visit    130/86  Theresa Clay CMA Rheumatology  4/18/2022                                 RAPID3 (0-30) Cumulative Score  2.8          RAPID3 Weighted Score (divide #4 by 3 and that is the weighted score)  0.9

## 2022-05-16 ENCOUNTER — LAB (OUTPATIENT)
Dept: LAB | Facility: OTHER | Age: 52
End: 2022-05-16
Payer: COMMERCIAL

## 2022-05-16 DIAGNOSIS — H20.00 HLA-B27 ASSOCIATED ACUTE ANTERIOR UVEITIS: ICD-10-CM

## 2022-05-16 DIAGNOSIS — M06.4 INFLAMMATORY POLYARTHROPATHY (H): ICD-10-CM

## 2022-05-16 DIAGNOSIS — Z79.899 HIGH RISK MEDICATIONS (NOT ANTICOAGULANTS) LONG-TERM USE: ICD-10-CM

## 2022-05-16 LAB
ALBUMIN SERPL-MCNC: 3.7 G/DL (ref 3.4–5)
ALP SERPL-CCNC: 50 U/L (ref 40–150)
ALT SERPL W P-5'-P-CCNC: 34 U/L (ref 0–70)
AST SERPL W P-5'-P-CCNC: 23 U/L (ref 0–45)
BASOPHILS # BLD AUTO: 0.1 10E3/UL (ref 0–0.2)
BASOPHILS NFR BLD AUTO: 1 %
BILIRUB DIRECT SERPL-MCNC: 0.2 MG/DL (ref 0–0.2)
BILIRUB SERPL-MCNC: 0.6 MG/DL (ref 0.2–1.3)
CREAT SERPL-MCNC: 0.8 MG/DL (ref 0.66–1.25)
EOSINOPHIL # BLD AUTO: 0.2 10E3/UL (ref 0–0.7)
EOSINOPHIL NFR BLD AUTO: 5 %
ERYTHROCYTE [DISTWIDTH] IN BLOOD BY AUTOMATED COUNT: 12.9 % (ref 10–15)
GFR SERPL CREATININE-BSD FRML MDRD: >90 ML/MIN/1.73M2
HCT VFR BLD AUTO: 44 % (ref 40–53)
HGB BLD-MCNC: 14.7 G/DL (ref 13.3–17.7)
LYMPHOCYTES # BLD AUTO: 1 10E3/UL (ref 0.8–5.3)
LYMPHOCYTES NFR BLD AUTO: 21 %
MCH RBC QN AUTO: 29.4 PG (ref 26.5–33)
MCHC RBC AUTO-ENTMCNC: 33.4 G/DL (ref 31.5–36.5)
MCV RBC AUTO: 88 FL (ref 78–100)
MONOCYTES # BLD AUTO: 0.9 10E3/UL (ref 0–1.3)
MONOCYTES NFR BLD AUTO: 19 %
NEUTROPHILS # BLD AUTO: 2.5 10E3/UL (ref 1.6–8.3)
NEUTROPHILS NFR BLD AUTO: 54 %
PLATELET # BLD AUTO: 296 10E3/UL (ref 150–450)
PROT SERPL-MCNC: 7.1 G/DL (ref 6.8–8.8)
RBC # BLD AUTO: 5 10E6/UL (ref 4.4–5.9)
WBC # BLD AUTO: 4.7 10E3/UL (ref 4–11)

## 2022-05-16 PROCEDURE — 36415 COLL VENOUS BLD VENIPUNCTURE: CPT

## 2022-05-16 PROCEDURE — 82565 ASSAY OF CREATININE: CPT

## 2022-05-16 PROCEDURE — 85025 COMPLETE CBC W/AUTO DIFF WBC: CPT

## 2022-05-16 PROCEDURE — 80076 HEPATIC FUNCTION PANEL: CPT

## 2022-07-27 ENCOUNTER — TELEPHONE (OUTPATIENT)
Dept: RHEUMATOLOGY | Facility: CLINIC | Age: 52
End: 2022-07-27

## 2022-07-27 DIAGNOSIS — M06.4 INFLAMMATORY POLYARTHROPATHY (H): ICD-10-CM

## 2022-07-27 DIAGNOSIS — H20.00 HLA-B27 ASSOCIATED ACUTE ANTERIOR UVEITIS: ICD-10-CM

## 2022-07-27 RX ORDER — LEFLUNOMIDE 10 MG/1
10 TABLET ORAL DAILY
Qty: 90 TABLET | Refills: 0 | Status: SHIPPED | OUTPATIENT
Start: 2022-07-27 | End: 2022-08-23

## 2022-07-27 RX ORDER — SULFASALAZINE 500 MG/1
1000 TABLET, DELAYED RELEASE ORAL 2 TIMES DAILY
Qty: 360 TABLET | Refills: 0 | Status: SHIPPED | OUTPATIENT
Start: 2022-07-27 | End: 2022-08-23

## 2022-07-27 NOTE — TELEPHONE ENCOUNTER
Rheumatology team: Please call to notify Mr. Bingham that leflunomide and sulfasalazine have been refilled.    Charles Bunn MD  7/27/2022 4:13 PM

## 2022-07-27 NOTE — TELEPHONE ENCOUNTER
Health Call Center    Phone Message    May a detailed message be left on voicemail: yes     Reason for Call: Medication Refill Request    Has the patient contacted the pharmacy for the refill? Yes   Name of medication being requested: Sulfasalazine and Leflunomide     Provider who prescribed the medication: Dr. Bunn  Pharmacy: [facility] in [facility] is switching pharmacies, previous pharmacy did not have any more refills on file left.    Please send in prescriptions refills to new pharmacy.  Date medication is needed: ASAP     Remind patient there is a 72-business hour turn around time on refill requests- yes      Action Taken: Other: [facility]

## 2022-08-08 ENCOUNTER — TRANSFERRED RECORDS (OUTPATIENT)
Dept: HEALTH INFORMATION MANAGEMENT | Facility: CLINIC | Age: 52
End: 2022-08-08

## 2022-08-20 ENCOUNTER — HEALTH MAINTENANCE LETTER (OUTPATIENT)
Age: 52
End: 2022-08-20

## 2022-08-22 NOTE — PATIENT INSTRUCTIONS
RHEUMATOLOGY    Dr. Charles Bunn    [address] [place] [address]  Phone number: [phone]  Fax number: [phone]      Thank you for choosing [facility]!    Theresa Clay CMA Rheumatology

## 2022-08-22 NOTE — PROGRESS NOTES
Bry Bingham  is a 52 year old year old who is being evaluated via a billable video visit.      How would you like to obtain your AVS? MyChart  If the video visit is dropped, the invitation should be resent by: Text to cell phone: [phone]   Will anyone else be joining your video visit? No     Rheumatology Video Visit      Bry Bingham MRN# [id number]   YOB: 1970 Age: 52 year old      Date of visit: 8/23/22   Ophthalmologist: Dr. Luis Eduardo Young from [facility]   PCP: Hood Melo at [facility]    Chief Complaint   Patient presents with:  uveitis    Assessment and Plan     1.  HLA-B27 associated recurrent left anterior uveitis: Acute onset per ophthalmologist.  HLA-B27 positive.  History of rheumatoid arthritis per patient report but no active synovitis seen on initial exam.   Sudden onset unilateral anterior uveitis was suggestive of an HLA-B27 association.  Reportedly had uveitis since 2009; was tx'd with MTX successfully for many years but he stopped it because of associated fatigue; did well off tx for 3-4 years until the uveitis flared and required steroid eye drops twice daily only partially controlled the left eye symptoms.    Leflunomide 20 mg daily was started with good benefit but liver enzymes elevated so it was reduced to leflunomide 10 mg daily.  He then presented with symmetric synovitis of the MCPs, so sulfasalazine was added with resolution of his inflammatory arthritis.  Currently on leflunomide 10mg daily and sulfasalazine 1000 mg twice daily.  Doing well with good control of arthritis and uveitis.  However, he is reporting some end of the day fatigue that could be multifactorial but it is possible to be related to sulfasalazine or leflunomide since he takes them at the end of the day with dinner.  Therefore, advised that he try holding sulfasalazine for 1 week and then restart sulfasalazine.  Then a couple weeks later hold leflunomide for 1 week and  then restart.  If he notices with either of these medications that the fatigue significantly improves when he holds it, and then the fatigue worsens when he restarts then he can notify this clinic and we can discuss another treatment option, such as a TNF inhibitor.  If a TNF inhibitor is needed, then it could be started and then 2-3 months later stop the medication that might be causing the fatigue.  If holding sulfasalazine and/or leflunomide does not improve the fatigue, then he should follow-up with his PCP for further evaluation. Chronic illness, stable.     - Continue sulfasalazine 1000mg BID     - Continue leflunomide 10mg daily    - Labs in 3 months: CBC, Creatinine, Hepatic Panel (lab orders to be faxed to [facility] in [place], [place], by SHAYY Cardenas)  - Labs in 6 months: CBC, Creatinine, Hepatic Panel, ESR, CRP  (lab orders to be faxed to [facility] in [place], [place], by SHAYY Cardenas)    High risk medication requiring intensive toxicity monitoring at least quarterly: labs ordered include CBC, Creatinine, Hepatic panel to monitor for cytopenia and hepatotoxicity; checking creatinine as it affects clearance of medication.      2. 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 See #1.  Controlled on sulfasalazine and leflunomide.  Chronic illness, stable.      3. Chewing tobacco: Advised complete cessation     4.  Bilateral knee osteoarthritis and osteoarthritis of the left first IP joint: Reviewed the diagnosis of osteoarthritis and the difference between an inflammatory and degenerative arthritis.  Topical Voltaren gel as needed.  Encouraged physical therapy exercises for his knees.  Chronic illness, progressive.    - Start diclofenac (VOLTAREN) 1 % topical gel; Apply up to 2 grams of 1% gel to upper extremity and up to 4 grams of 1% gel to lower extremity up to 4 times daily as needed for joint pain.  Use for hand and knee osteoarthritis  Dispense: 200 g; Refill: 2    5.  Fatigue: See #1    6.  Vaccinations:  Vaccinations reviewed with Mr. Bingham.  Risks and benefits of vaccinations were discussed.    - Influenza: encouraged yearly vaccination  - [id number]: up to date  - [id number]: up to date  - Shingrix: up to date    - COVID-19: has received the Pfizer COVID-19 vaccine on 3/21/2021 and 4/11/2021 and 10/18/2021.  Moderna on 4/25/2022.  A 5th mRNA vaccine (2nd booster) may be received at least 4 months after the 4th dose.   Based on our current understanding (and this may change over time as we learn more), medications should be adjusted as noted below, if disease activity allows:  - NSAIDs and Acetaminophen: hold for 24 hours prior to vaccination if able to do so  - Sulfasalazine should be held for 1-2 weeks after each COVID-19 vaccine (as disease activity allows)  - Leflunomide should be held for 1-2 weeks after each COVID-19 vaccine (as disease activity allows)     Total minutes spent in evaluation with patient, documentation, , and review of pertinent studies and chart notes: 22    Mr. Bingham verbalized agreement with and understanding of the rational for the diagnosis and treatment plan.  All questions were answered to best of my ability and the patient's satisfaction. Mr. Bingham was advised to contact the clinic with any questions that may arise after the clinic visit.      Thank you for involving me in the care of the patient    Return to clinic: 6 months    HPI   Bry Bingham is a 52 year old male with a past medical history significant for iritis and rheumatoid arthritis who is seen for follow-up of recurrent left acute anterior uveitis.    Today, 8/23/2022: Has been doing well except for bony hypertrophy and mild ache at the left thumb IP joint and some pain at both knees at the medial aspects; pain at these joints are worse with activity and improved with rest; no soft tissue swelling, increased warmth, or overlying erythema.  Otherwise has been doing well with no other joint pain.  Morning  stiffness for no more than 10 minutes.  No eye pain or redness or blurry vision.  Fatigue at the end of the day; difficult take leflunomide and sulfasalazine with dinner.  Sleeps well, wakes up feeling refreshed.  Questions if the medications are related to the fatigue.    Denies fevers, chills, nausea, vomiting, constipation, diarrhea. No abdominal pain. No chest pain/pressure, palpitations, or shortness of breath. No LE swelling. No neck pain.  No lower back pain or lower back stiffness.  No oral or nasal sores.  No rash.    Tobacco: Chewing tobacco  EtOH: 1-2 drinks every 2 weeks  Drugs: None  Occupation:  for [employer]   12 point review of system was completed and negative except as noted in the HPI     Active Problem List     Patient Active Problem List   Diagnosis     HLA-B27 associated acute anterior uveitis     Past Medical History   History reviewed. No pertinent past medical history.  Past Surgical History   History reviewed. No pertinent surgical history.  Allergy   No Known Allergies  Current Medication List     Current Outpatient Medications   Medication Sig     leflunomide (ARAVA) 10 MG tablet Take 1 tablet (10 mg) by mouth daily     lisinopril (ZESTRIL) 10 MG tablet      sulfaSALAzine ER (AZULFIDINE EN) 500 MG EC tablet Take 2 tablets (1,000 mg) by mouth 2 times daily     No current facility-administered medications for this visit.         Social History   See HPI    Family History     Denies family history of rheumatologic disease    Physical Exam     Temp Readings from Last 3 Encounters:   12/17/18 98  F (36.7  C) (Oral)     BP Readings from Last 5 Encounters:   04/18/22 130/86   11/22/19 130/88   08/02/19 134/80   05/10/19 136/88   12/17/18 (!) 156/98     Pulse Readings from Last 1 Encounters:   04/18/22 87     Resp Readings from Last 1 Encounters:   08/06/18 16     There is no height or weight on file to calculate BMI.      GEN: NAD. Healthy appearing adult.   HEENT:  Anicteric,  noninjected sclera. No obvious external lesions of the ear and nose. Hearing intact.  PULM: No increased work of breathing.   MSK: Asymmetric hypertrophy at the left thumb IP joint; mild Heberden's nodes; no other swelling of the hands.  No overlying erythema of the hands.    SKIN: No rash or jaundice seen  PSYCH: Alert. Appropriate.        Labs / Imaging (select studies)   [facility] labs on 12/3/2012: HLA-B27 positive    8/8/2022 [facility] labs okay      RF/CCP  Recent Labs   Lab Test 08/06/18  0929   CCPIGG 1   RHF <20     CBC  Recent Labs   Lab Test 05/16/22  1058 03/07/22  0956 10/20/21  1539 02/08/21  1056 10/19/20  1055   WBC 4.7 4.8 5.6 4.6 5.0   RBC 5.00 5.32 4.87 5.42 5.43   HGB 14.7 15.6 14.5 16.1 16.0   HCT 44.0 46.0 42.6 47.2 46.7   MCV 88 87 88 87 86   RDW 12.9 12.9 12.0 13.3 12.9    317 311 269 265   MCH 29.4 29.3 29.8 29.7 29.5   MCHC 33.4 33.9 34.0 34.1 34.3   NEUTROPHIL 54 53 56 52.3 56.6   LYMPH 21 21 18 22.2 22.2   MONOCYTE 19 19 23 19.3 17.8   EOSINOPHIL 5 6 2 5.3 2.6   BASOPHIL 1 1 1 0.9 0.8   ANEU  --   --  3.1 2.4 2.8   ALYM  --   --  1.0 1.0 1.1   AURORA  --   --  1.3 0.9 0.9   AEOS  --   --  0.1 0.2 0.1   ABAS  --   --  0.1 0.0 0.0   ANEUTAUTO 2.5 2.6  --   --   --    ALYMPAUTO 1.0 1.0  --   --   --    AMONOAUTO 0.9 0.9  --   --   --    AEOSAUTO 0.2 0.3  --   --   --    ABSBASO 0.1 0.1  --   --   --      CMP  Recent Labs   Lab Test 05/16/22  1058 03/07/22  0956 10/20/21  1539 02/08/21  1056 10/19/20  1055 07/07/20  1350 09/10/18  1101 08/06/18  0929   NA  --   --   --   --   --   --   --  137   POTASSIUM  --   --   --   --   --   --   --  4.7   CHLORIDE  --   --   --   --   --   --   --  104   CO2  --   --   --   --   --   --   --  25   ANIONGAP  --   --   --   --   --   --   --  8   GLC  --   --   --   --   --   --   --  84   BUN  --   --   --   --   --   --   --  14   CR 0.80 0.82 0.95 1.04 0.88 0.89   < > 0.82   GFRESTIMATED >90 >90 >90 82 >90 >90   < > >90   GFRESTBLACK  --    --   --  >90 >90 >90   < > >90   NICOLA  --   --   --   --   --   --   --  8.8   BILITOTAL 0.6 0.5 0.5 0.9 1.4* 0.8   < > 0.5   ALBUMIN 3.7 3.8 3.5 3.8 4.0 3.7   < > 3.8   PROTTOTAL 7.1 7.4 7.0 7.3 7.8 7.3   < > 7.7   ALKPHOS 50 50 58 49 55 48   < > 70   AST 23 26 35 29 28 24   < > 24   ALT 34 33 49 37 40 38   < > 41    < > = values in this interval not displayed.     Calcium/VitaminD  Recent Labs   Lab Test 08/06/18  0929   NICOLA 8.8     ESR/CRP  Recent Labs   Lab Test 03/07/22  0956 10/20/21  1539 10/19/20  1055   SED 4 7 4   CRP <2.9 7.4 <2.9       Hepatitis B  Recent Labs   Lab Test 08/06/18  0929   HBCAB Nonreactive   HEPBANG Nonreactive     Hepatitis C  Recent Labs   Lab Test 08/06/18  0929   HCVAB Nonreactive     Lyme ab screening  Recent Labs   Lab Test 08/06/18  0929   LYMEGM 0.06       Tuberculosis Screening  Recent Labs   Lab Test 08/06/18  0929   TBRES Negative       Immunization History     Immunization History   Administered Date(s) Administered     COVID-19,PF,Moderna 04/25/2022     COVID-19,PF,Pfizer (12+ Yrs) 03/21/2021, 04/11/2021, 10/18/2021     Pneumo Conj 13-V (2010&after) 12/17/2018     Pneumococcal 23 valent 05/10/2019          Chart documentation done in part with Dragon Voice recognition Software. Although reviewed after completion, some word and grammatical error may remain.      Video-Visit Details    Type of service:  Video Visit    Video Start Time: 8:08 AM    Video End Time:8:19 AM    Originating Location (pt. Location): [place], [place]    Distant Location (provider location):  [place]    Platform used for Video Visit: Jose Juan Bunn MD

## 2022-08-23 ENCOUNTER — VIRTUAL VISIT (OUTPATIENT)
Dept: RHEUMATOLOGY | Facility: CLINIC | Age: 52
End: 2022-08-23
Payer: COMMERCIAL

## 2022-08-23 DIAGNOSIS — M17.0 PRIMARY OSTEOARTHRITIS OF BOTH KNEES: ICD-10-CM

## 2022-08-23 DIAGNOSIS — M06.4 INFLAMMATORY POLYARTHROPATHY (H): Primary | ICD-10-CM

## 2022-08-23 DIAGNOSIS — Z79.899 HIGH RISK MEDICATIONS (NOT ANTICOAGULANTS) LONG-TERM USE: ICD-10-CM

## 2022-08-23 DIAGNOSIS — H20.00 HLA-B27 ASSOCIATED ACUTE ANTERIOR UVEITIS: ICD-10-CM

## 2022-08-23 DIAGNOSIS — M19.042 PRIMARY OSTEOARTHRITIS OF LEFT HAND: ICD-10-CM

## 2022-08-23 PROCEDURE — 99214 OFFICE O/P EST MOD 30 MIN: CPT | Mod: 95 | Performed by: INTERNAL MEDICINE

## 2022-08-23 RX ORDER — LEFLUNOMIDE 10 MG/1
10 TABLET ORAL DAILY
Qty: 90 TABLET | Refills: 0 | Status: SHIPPED | OUTPATIENT
Start: 2022-08-23 | End: 2023-02-20

## 2022-08-23 RX ORDER — SULFASALAZINE 500 MG/1
1000 TABLET, DELAYED RELEASE ORAL 2 TIMES DAILY
Qty: 360 TABLET | Refills: 0 | Status: SHIPPED | OUTPATIENT
Start: 2022-08-23 | End: 2023-02-20

## 2022-08-23 NOTE — PROGRESS NOTES
Theresa: Please fax the lab orders that were entered today, 8/23/2022, to [facility] in [place], [place].    Thank you,  Charles Bunn MD  8/23/2022

## 2022-08-23 NOTE — Clinical Note
Theresa: Please fax the lab orders that were entered today, 8/23/2022, to [facility] in [place], [place].   Thank you, Charles Bunn MD 8/23/2022

## 2022-08-24 NOTE — PROGRESS NOTES
Lab orders faxed to [facility] in [facility].  Theresa Clay CMA Rheumatology  8/24/2022

## 2022-11-20 ENCOUNTER — HEALTH MAINTENANCE LETTER (OUTPATIENT)
Age: 52
End: 2022-11-20

## 2023-02-15 DIAGNOSIS — M06.4 INFLAMMATORY POLYARTHROPATHY (H): ICD-10-CM

## 2023-02-15 DIAGNOSIS — H20.00 HLA-B27 ASSOCIATED ACUTE ANTERIOR UVEITIS: ICD-10-CM

## 2023-02-15 NOTE — TELEPHONE ENCOUNTER
Requested Prescriptions   Pending Prescriptions Disp Refills     sulfaSALAzine ER (AZULFIDINE EN) 500 MG EC tablet 360 tablet 0     Sig: Take 2 tablets (1,000 mg) by mouth 2 times daily .  Labs required every 3 months.       There is no refill protocol information for this order

## 2023-02-17 RX ORDER — SULFASALAZINE 500 MG/1
1000 TABLET, DELAYED RELEASE ORAL 2 TIMES DAILY
Qty: 360 TABLET | Refills: 0 | OUTPATIENT
Start: 2023-02-17

## 2023-02-17 NOTE — TELEPHONE ENCOUNTER
Requested Prescriptions   Pending Prescriptions Disp Refills     sulfaSALAzine ER (AZULFIDINE EN) 500 MG EC tablet 360 tablet 0     Sig: Take 2 tablets (1,000 mg) by mouth 2 times daily .  Labs required every 3 months.       There is no refill protocol information for this order        Routing refill request to provider for review/approval because:  Drug not on the [facility] refill protocol     LOV 8/23/22  Next 2/20/23  Last labs noted 11/2/22    Saritha ROBERTS, Specialty RN 2/17/2023 9:07 AM

## 2023-02-17 NOTE — TELEPHONE ENCOUNTER
Called pt and left vm that he needs labs completed for his rheum meds, left call back number    Kandi LADD, RN Specialty Triage 2/17/2023 4:09 PM

## 2023-02-17 NOTE — TELEPHONE ENCOUNTER
RN: Labs are overdue.  Please instruct patient to have labs completed    Charles Bunn MD  2/17/2023

## 2023-02-20 ENCOUNTER — VIRTUAL VISIT (OUTPATIENT)
Dept: RHEUMATOLOGY | Facility: CLINIC | Age: 53
End: 2023-02-20
Payer: COMMERCIAL

## 2023-02-20 ENCOUNTER — TRANSFERRED RECORDS (OUTPATIENT)
Dept: HEALTH INFORMATION MANAGEMENT | Facility: CLINIC | Age: 53
End: 2023-02-20

## 2023-02-20 DIAGNOSIS — I10 HYPERTENSION, UNSPECIFIED TYPE: ICD-10-CM

## 2023-02-20 DIAGNOSIS — H20.00 HLA-B27 ASSOCIATED ACUTE ANTERIOR UVEITIS: ICD-10-CM

## 2023-02-20 DIAGNOSIS — M06.4 INFLAMMATORY POLYARTHROPATHY (H): Primary | ICD-10-CM

## 2023-02-20 DIAGNOSIS — Z79.899 HIGH RISK MEDICATIONS (NOT ANTICOAGULANTS) LONG-TERM USE: ICD-10-CM

## 2023-02-20 PROCEDURE — 99214 OFFICE O/P EST MOD 30 MIN: CPT | Mod: VID | Performed by: INTERNAL MEDICINE

## 2023-02-20 RX ORDER — LEFLUNOMIDE 10 MG/1
10 TABLET ORAL DAILY
Qty: 90 TABLET | Refills: 0 | Status: SHIPPED | OUTPATIENT
Start: 2023-02-20 | End: 2023-05-15

## 2023-02-20 RX ORDER — SULFASALAZINE 500 MG/1
1000 TABLET, DELAYED RELEASE ORAL 2 TIMES DAILY
Qty: 360 TABLET | Refills: 0 | Status: SHIPPED | OUTPATIENT
Start: 2023-02-20 | End: 2023-05-15

## 2023-02-20 RX ORDER — AMLODIPINE BESYLATE 2.5 MG/1
2.5 TABLET ORAL DAILY
COMMUNITY
Start: 2023-02-20

## 2023-02-20 NOTE — PROGRESS NOTES
Rheumatology team: please fax labs orders entered on 2/20/2023 to [facility] in [place], [place]    Thank you,  Charles Bunn MD  2/20/2023

## 2023-02-20 NOTE — Clinical Note
Rheumatology team: please fax labs orders entered on 2/20/2023 to [facility] in [place], [place]  Thank you, Charles Bunn MD 2/20/2023

## 2023-02-20 NOTE — PROGRESS NOTES
Bry Bingham  is a 52 year old year old who is being evaluated via a billable video visit.      How would you like to obtain your AVS? MyChart  If the video visit is dropped, the invitation should be resent by: Text to cell phone: [phone]   Will anyone else be joining your video visit? No     Rheumatology Video Visit      Bry Bingham MRN# [id number]   YOB: 1970 Age: 52 year old      Date of visit: 2/20/23   Ophthalmologist: Dr. Luis Eduardo Young from [facility]   PCP: Hood Melo at [facility]    Chief Complaint   Patient presents with:  uveitis, arthritis    Assessment and Plan     1.  HLA-B27 associated recurrent left anterior uveitis: Acute onset per ophthalmologist.  HLA-B27 positive.  History of rheumatoid arthritis per patient report but no active synovitis seen on initial exam.   Sudden onset unilateral anterior uveitis was suggestive of an HLA-B27 association.  Reportedly had uveitis since 2009; was tx'd with MTX successfully for many years but he stopped it because of associated fatigue; did well off tx for 3-4 years until the uveitis flared and required steroid eye drops twice daily only partially controlled the left eye symptoms.    Leflunomide 20 mg daily was started with good benefit but liver enzymes elevated so it was reduced to leflunomide 10 mg daily.  He then presented with symmetric synovitis of the MCPs, so sulfasalazine was added with resolution of his inflammatory arthritis.  Currently on leflunomide 10mg daily and sulfasalazine 1000 mg twice daily.  Doing well with good control of arthritis and uveitis.  However, he is reporting some end of the day fatigue that could be multifactorial but it is possible to be related to sulfasalazine or leflunomide since he takes them at the end of the day with dinner.  Therefore, advised that he try holding sulfasalazine for 1 week and then restart sulfasalazine.  Then a couple weeks later hold leflunomide for 1  week and then restart.  If he notices with either of these medications that the fatigue significantly improves when he holds it, and then the fatigue worsens when he restarts then he can notify this clinic and we can discuss another treatment option, such as a TNF inhibitor.  If a TNF inhibitor is needed, then it could be started and then 2-3 months later stop the medication that might be causing the fatigue.  If holding sulfasalazine and/or leflunomide does not improve the fatigue, then he should follow-up with his PCP for further evaluation. Chronic illness, stable.     - Continue sulfasalazine 1000mg BID     - Continue leflunomide 10mg daily    - Labs in mid-May 2023: CBC, Creatinine, Hepatic Panel (lab orders to be faxed to [facility] in [place], [place])  - Labs in mid-August 2023: CBC, Creatinine, Hepatic Panel, ESR, CRP  (lab orders to be faxed to [place], [place])    High risk medication requiring intensive toxicity monitoring at least quarterly: labs ordered include CBC, Creatinine, Hepatic panel to monitor for cytopenia and hepatotoxicity; checking creatinine as it affects clearance of medication.      2. 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 See #1.  Controlled on sulfasalazine and leflunomide.  Chronic illness, stable.      3. Chewing tobacco: Advised complete cessation     4.  Bilateral knee osteoarthritis and hand osteoarthritis:  Heberden's and Gwen's nodes present.    Topical Voltaren gel as needed.  Encouraged physical therapy exercises for his knees on a daily basis.  Doing well at this time.    5.  Fatigue: No longer having fatigue, per patient    6.  Hypertension: He reports now taking amlodipine, in addition to lisinopril, with control of his hypertension at this time.  Managed by his primary care provider and documented here for historical significance.    7.  Vaccinations: Vaccinations reviewed with Mr. Bingham.  Risks and benefits of vaccinations were discussed.    -  Influenza: encouraged yearly vaccination  - [id number]: up to date  - [id number]: up to date  - Shingrix: up to date  - COVID-19: Up to date     Total minutes spent in evaluation with patient, documentation, , and review of pertinent studies and chart notes: 20    Mr. Bingham verbalized agreement with and understanding of the rational for the diagnosis and treatment plan.  All questions were answered to best of my ability and the patient's satisfaction. Mr. Bingham was advised to contact the clinic with any questions that may arise after the clinic visit.      Thank you for involving me in the care of the patient    Return to clinic: 6 months    HPI   Bry Bingham is a 52 year old male with a past medical history significant for iritis and rheumatoid arthritis who is seen for follow-up of recurrent left acute anterior uveitis.    Today, 2/20/2023: Bony hypertrophy on the dorsal aspect of the left thumb IP joint that is without increased warmth or overlying erythema, sometimes bothersome with increased activity, and improves with rest.  Other joints are doing well.  Knee pain responded well to topical Voltaren gel as needed, used infrequently.  Hypertension now also treated with amlodipine 2.5 mg daily that was started by his primary care provider with now good control of the hypertension per patient.  Morning stiffness for no more than 10 minutes.  No joint swelling.  No recurrence of uveitis.  Reports that he is happy with how well he is doing.  No longer having fatigue.    Denies fevers, chills, nausea, vomiting, constipation, diarrhea. No abdominal pain. No chest pain/pressure, palpitations, or shortness of breath. No LE swelling. No neck pain.  No lower back pain or lower back stiffness.  No oral or nasal sores.  No rash.    Tobacco: Chewing tobacco  EtOH: 1-2 drinks every 2 weeks  Drugs: None  Occupation:  for [employer]   12 point review of system was completed and negative except as  noted in the [facility]     Active Problem List     Patient Active Problem List   Diagnosis     HLA-B27 associated acute anterior uveitis     Past Medical History   History reviewed. No pertinent past medical history.  Past Surgical History   History reviewed. No pertinent surgical history.  Allergy   No Known Allergies  Current Medication List     Current Outpatient Medications   Medication Sig     amLODIPine (NORVASC) 2.5 MG tablet Take 1 tablet (2.5 mg) by mouth daily     diclofenac (VOLTAREN) 1 % topical gel Apply up to 2 grams of 1% gel to upper extremity and up to 4 grams of 1% gel to lower extremity up to 4 times daily as needed for joint pain.  Use for hand and knee osteoarthritis     leflunomide (ARAVA) 10 MG tablet Take 1 tablet (10 mg) by mouth daily .  Labs required every 3 months.     lisinopril (ZESTRIL) 10 MG tablet      sulfaSALAzine ER (AZULFIDINE EN) 500 MG EC tablet Take 2 tablets (1,000 mg) by mouth 2 times daily .  Labs required every 3 months.     No current facility-administered medications for this visit.         Social History   See [facility]    Family History     Denies family history of rheumatologic disease    Physical Exam     Temp Readings from Last 3 Encounters:   12/17/18 98  F (36.7  C) (Oral)     BP Readings from Last 5 Encounters:   04/18/22 130/86   11/22/19 130/88   08/02/19 134/80   05/10/19 136/88   12/17/18 (!) 156/98     Pulse Readings from Last 1 Encounters:   04/18/22 87     Resp Readings from Last 1 Encounters:   08/06/18 16     There is no height or weight on file to calculate BMI.      GEN: NAD. Healthy appearing adult.   HEENT:  Anicteric, noninjected sclera. No obvious external lesions of the ear and nose. Hearing intact.  PULM: No increased work of breathing.   MSK: Asymmetric hypertrophy at the left thumb IP joint; Heberden's nodes; no other swelling of the hands.  No overlying erythema of the hands.    SKIN: No rash or jaundice seen  PSYCH: Alert. Appropriate.       Labs /  Imaging (select studies)   [facility] on 12/3/2012: HLA-B27 positive    8/8/2022 [facility] okay    RF/CCP  Recent Labs   Lab Test 08/06/18  0929   CCPIGG 1   RHF <20     CBC  Recent Labs   Lab Test 05/16/22  1058 03/07/22  0956 10/20/21  1539 02/08/21  1056 10/19/20  1055   WBC 4.7 4.8 5.6 4.6 5.0   RBC 5.00 5.32 4.87 5.42 5.43   HGB 14.7 15.6 14.5 16.1 16.0   HCT 44.0 46.0 42.6 47.2 46.7   MCV 88 87 88 87 86   RDW 12.9 12.9 12.0 13.3 12.9    317 311 269 265   MCH 29.4 29.3 29.8 29.7 29.5   MCHC 33.4 33.9 34.0 34.1 34.3   NEUTROPHIL 54 53 56 52.3 56.6   LYMPH 21 21 18 22.2 22.2   MONOCYTE 19 19 23 19.3 17.8   EOSINOPHIL 5 6 2 5.3 2.6   BASOPHIL 1 1 1 0.9 0.8   ANEU  --   --  3.1 2.4 2.8   ALYM  --   --  1.0 1.0 1.1   AURORA  --   --  1.3 0.9 0.9   AEOS  --   --  0.1 0.2 0.1   ABAS  --   --  0.1 0.0 0.0   ANEUTAUTO 2.5 2.6  --   --   --    ALYMPAUTO 1.0 1.0  --   --   --    AMONOAUTO 0.9 0.9  --   --   --    AEOSAUTO 0.2 0.3  --   --   --    ABSBASO 0.1 0.1  --   --   --      CMP  Recent Labs   Lab Test 05/16/22  1058 03/07/22  0956 10/20/21  1539 02/08/21  1056 10/19/20  1055 07/07/20  1350 09/10/18  1101 08/06/18  0929   NA  --   --   --   --   --   --   --  137   POTASSIUM  --   --   --   --   --   --   --  4.7   CHLORIDE  --   --   --   --   --   --   --  104   CO2  --   --   --   --   --   --   --  25   ANIONGAP  --   --   --   --   --   --   --  8   GLC  --   --   --   --   --   --   --  84   BUN  --   --   --   --   --   --   --  14   CR 0.80 0.82 0.95 1.04 0.88 0.89   < > 0.82   GFRESTIMATED >90 >90 >90 82 >90 >90   < > >90   GFRESTBLACK  --   --   --  >90 >90 >90   < > >90   NICOLA  --   --   --   --   --   --   --  8.8   BILITOTAL 0.6 0.5 0.5 0.9 1.4* 0.8   < > 0.5   ALBUMIN 3.7 3.8 3.5 3.8 4.0 3.7   < > 3.8   PROTTOTAL 7.1 7.4 7.0 7.3 7.8 7.3   < > 7.7   ALKPHOS 50 50 58 49 55 48   < > 70   AST 23 26 35 29 28 24   < > 24   ALT 34 33 49 37 40 38   < > 41    < > = values in this interval not  displayed.     Calcium/VitaminD  Recent Labs   Lab Test 08/06/18  0929   NICOLA 8.8     ESR/CRP  Recent Labs   Lab Test 03/07/22  0956 10/20/21  1539 10/19/20  1055   SED 4 7 4   CRP <2.9 7.4 <2.9     Hepatitis B  Recent Labs   Lab Test 08/06/18  0929   HBCAB Nonreactive   HEPBANG Nonreactive     Hepatitis C  Recent Labs   Lab Test 08/06/18  0929   HCVAB Nonreactive     Lyme ab screening  Recent Labs   Lab Test 08/06/18  0929   LYMEGM 0.06     Tuberculosis Screening  Recent Labs   Lab Test 08/06/18  0929   TBRES Negative     Immunization History     Immunization History   Administered Date(s) Administered     COVID-19 Vaccine 12+ (Pfizer) 03/21/2021, 04/11/2021, 10/18/2021     COVID-19 Vaccine 18+ (Moderna) 04/25/2022     COVID-19 Vaccine Bivalent Booster 12+ (Pfizer) 10/04/2022     Pneumo Conj 13-V (2010&after) 12/17/2018     Pneumococcal 23 valent 05/10/2019          Chart documentation done in part with Dragon Voice recognition Software. Although reviewed after completion, some word and grammatical error may remain.      Video-Visit Details    Type of service:  Video Visit    Video Start Time: 8:07 AM    Video End Time: 8:20 AM    Originating Location (pt. Location): Home, [place]    Distant Location (provider location):  Off site, [place]    Platform used for Video Visit: Jose Juan Bunn MD

## 2023-02-20 NOTE — PATIENT INSTRUCTIONS
RHEUMATOLOGY    Dr. Charles Bunn    [facility]  [address] [place] [address]  [place] [address]  Phone number: [phone]  Fax number: [phone]    You may schedule your FLU shot by calling [phone] or if you would like to get your shot at a [place] pharmacy you may schedule online at [email or web address].    Thank you for choosing [facility]!    Theresa Clay CMA Rheumatology

## 2023-03-08 DIAGNOSIS — M06.4 INFLAMMATORY POLYARTHROPATHY (H): ICD-10-CM

## 2023-03-08 DIAGNOSIS — H20.00 HLA-B27 ASSOCIATED ACUTE ANTERIOR UVEITIS: ICD-10-CM

## 2023-03-08 NOTE — TELEPHONE ENCOUNTER
Pending Prescriptions:                       Disp   Refills    sulfaSALAzine ER (AZULFIDINE EN) 500 MG E*360 ta*0            Sig: Take 2 tablets (1,000 mg) by mouth 2 times daily           .  Labs required every 3 months.

## 2023-03-13 RX ORDER — SULFASALAZINE 500 MG/1
1000 TABLET, DELAYED RELEASE ORAL 2 TIMES DAILY
Qty: 360 TABLET | Refills: 0 | OUTPATIENT
Start: 2023-03-13

## 2023-03-13 NOTE — TELEPHONE ENCOUNTER
Too soon of a refill. Was given 3month supply in 2/2023    Kandi LADD RN Specialty Triage 3/13/2023 2:57 PM

## 2023-04-15 ENCOUNTER — HEALTH MAINTENANCE LETTER (OUTPATIENT)
Age: 53
End: 2023-04-15

## 2023-05-09 ENCOUNTER — TRANSFERRED RECORDS (OUTPATIENT)
Dept: HEALTH INFORMATION MANAGEMENT | Facility: CLINIC | Age: 53
End: 2023-05-09
Payer: COMMERCIAL

## 2023-05-15 DIAGNOSIS — M06.4 INFLAMMATORY POLYARTHROPATHY (H): ICD-10-CM

## 2023-05-15 DIAGNOSIS — H20.00 HLA-B27 ASSOCIATED ACUTE ANTERIOR UVEITIS: ICD-10-CM

## 2023-05-15 RX ORDER — LEFLUNOMIDE 10 MG/1
10 TABLET ORAL DAILY
Qty: 90 TABLET | Refills: 0 | Status: SHIPPED | OUTPATIENT
Start: 2023-05-15 | End: 2023-08-18

## 2023-05-15 RX ORDER — SULFASALAZINE 500 MG/1
1000 TABLET, DELAYED RELEASE ORAL 2 TIMES DAILY
Qty: 360 TABLET | Refills: 0 | Status: SHIPPED | OUTPATIENT
Start: 2023-05-15 | End: 2023-07-24

## 2023-07-21 DIAGNOSIS — H20.00 HLA-B27 ASSOCIATED ACUTE ANTERIOR UVEITIS: ICD-10-CM

## 2023-07-21 DIAGNOSIS — M06.4 INFLAMMATORY POLYARTHROPATHY (H): ICD-10-CM

## 2023-07-21 NOTE — TELEPHONE ENCOUNTER
Medication:   Sulfasalazine  Last written on:   05/13/2023  Quantity:   360    Refills:   0    Last office visit:   02/02/2023  Next office visit:   08/18/2023  Last labs:   05/09/2023

## 2023-07-24 RX ORDER — SULFASALAZINE 500 MG/1
1000 TABLET, DELAYED RELEASE ORAL 2 TIMES DAILY
Qty: 360 TABLET | Refills: 0 | Status: SHIPPED | OUTPATIENT
Start: 2023-07-24 | End: 2023-08-18

## 2023-08-14 ENCOUNTER — TRANSFERRED RECORDS (OUTPATIENT)
Dept: HEALTH INFORMATION MANAGEMENT | Facility: CLINIC | Age: 53
End: 2023-08-14
Payer: COMMERCIAL

## 2023-08-14 LAB
ALT SERPL-CCNC: 29 U/L (ref 12–78)
AST SERPL-CCNC: 32 U/L (ref 10–40)
CREATININE (EXTERNAL): 0.9 MG/DL (ref 0.8–1.5)
GFR ESTIMATED (EXTERNAL): >90 ML/MIN/1.73M2

## 2023-08-17 NOTE — PATIENT INSTRUCTIONS
RHEUMATOLOGY    [facility] [place]  [address] [place] [address]  [place] [address]    Phone number: [phone]  Fax number: [phone]    If you need a medication refill, please contact us as you may need lab work and/or a follow up visit prior to your refill.      Thank you for choosing [facility]!    Theresa Clay CMA Rheumatology

## 2023-08-17 NOTE — PROGRESS NOTES
Bry Bingham  is a 53 year old year old who is being evaluated via a billable video visit.      How would you like to obtain your AVS? MyChart  If the video visit is dropped, the invitation should be resent by: Text to cell phone: [phone]   Will anyone else be joining your video visit? No     Rheumatology Video Visit      Bry Bingham MRN# [id number]   YOB: 1970 Age: 53 year old      Date of visit: 8/18/23   Ophthalmologist: Dr. Luis Eduardo Young from [facility]   PCP: Hood Melo at [facility]    Chief Complaint   Patient presents with:  Inflammatory polyarthropathy    Assessment and Plan     1.  HLA-B27 associated recurrent left anterior uveitis: History of acute onset per ophthalmologist.  HLA-B27 positive.  History of rheumatoid arthritis per patient report but no active synovitis seen on initial exam.   Sudden onset unilateral anterior uveitis was suggestive of an HLA-B27 association.  Reportedly had uveitis since 2009; was tx'd with MTX successfully for many years but he stopped it because of associated fatigue; did well off tx for 3-4 years until the uveitis flared and required steroid eye drops twice daily only partially controlled the left eye symptoms.    Leflunomide 20 mg daily was started with good benefit but liver enzymes elevated so it was reduced to leflunomide 10 mg daily.  He then presented with symmetric synovitis of the MCPs, so sulfasalazine was added with resolution of his inflammatory arthritis.  Currently on leflunomide 10mg daily and sulfasalazine 1000 mg twice daily.  Doing well with good control of arthritis and uveitis.  No longer with fatigue.  If needed in the future could consider addition of a TNF inhibitor or sulfasalazine dose increase.  Chronic illness, stable.     - Continue sulfasalazine 1000mg BID     - Continue leflunomide 10mg daily  (historically had LFT elevation with 20 mg daily)  - Labs in early November 2023: CBC, Creatinine,  Hepatic Panel (lab orders to be faxed to [facility] in [place], [place])  - Labs in early February 2024: CBC, Creatinine, Hepatic Panel, ESR, CRP  (lab orders to be faxed to [facility] in [place], [place])    High risk medication requiring intensive toxicity monitoring at least quarterly     2. 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 See #1.  Controlled on sulfasalazine and leflunomide.  Chronic illness, stable.      3. Chewing tobacco: Advised complete cessation     4.  Bilateral knee osteoarthritis and hand osteoarthritis:  Heberden's and Gwen's nodes present.    Topical Voltaren gel as needed; this is effective.  Encouraged physical therapy exercises for his knees on a daily basis.  Doing well at this time.    5.  History of fatigue: No longer having fatigue, per patient    6.  Vaccinations: Vaccinations reviewed with Mr. Bingham.  Risks and benefits of vaccinations were discussed.    - Influenza: encouraged yearly vaccination  - [id number]: up to date  - [id number]: up to date  - Shingrix: up to date  - COVID-19: Advised keeping updated, and to hold sulfasalazine and leflunomide for 1-2 weeks afterward     Total minutes spent in evaluation with patient, documentation, , and review of pertinent studies and chart notes: 20    Mr. Bingham verbalized agreement with and understanding of the rational for the diagnosis and treatment plan.  All questions were answered to best of my ability and the patient's satisfaction. Mr. Bingham was advised to contact the clinic with any questions that may arise after the clinic visit.      Thank you for involving me in the care of the patient    Return to clinic: 6 months    HPI   Bry Bingham is a 53 year old male with a past medical history significant for iritis and rheumatoid arthritis who is seen for follow-up of recurrent left acute anterior uveitis.    2/20/2023: Bony hypertrophy on the dorsal aspect of the left thumb IP joint that is without increased warmth  or overlying erythema, sometimes bothersome with increased activity, and improves with rest.  Other joints are doing well.  Knee pain responded well to topical Voltaren gel as needed, used infrequently.  Hypertension now also treated with amlodipine 2.5 mg daily that was started by his primary care provider with now good control of the hypertension per patient.  Morning stiffness for no more than 10 minutes.  No joint swelling.  No recurrence of uveitis.  Reports that he is happy with how well he is doing.  No longer having fatigue.    Today, 8/18/2023: Thumb osteoarthritis improved with Voltaren gel and stretching exercises.  Other joints are doing well.  No other joint pain.  Morning stiffness for no more than 10 minutes.  No stomach upset associated with sulfasalazine or leflunomide.  No fatigue.  States that he saw his ophthalmologist within the last month and reports that the uveitis is quiescent; and is not requiring eyedrops.  No dry eye or dry mouth.  Overall states that he is happy with how well he is doing.  Labs were done earlier this week; awaiting results to be sent to me.    Denies fevers, chills, nausea, vomiting, constipation, diarrhea. No abdominal pain. No chest pain/pressure, palpitations, or shortness of breath. No LE swelling. No neck pain.  No lower back pain or lower back stiffness.  No oral or nasal sores.  No rash.    Tobacco: Chewing tobacco  EtOH: 1-2 drinks every 2 weeks  Drugs: None  Occupation:  for [employer]   12 point review of system was completed and negative except as noted in the HPI     Active Problem List     Patient Active Problem List   Diagnosis    HLA-B27 associated acute anterior uveitis     Past Medical History   History reviewed. No pertinent past medical history.  Past Surgical History   History reviewed. No pertinent surgical history.  Allergy   No Known Allergies  Current Medication List     Current Outpatient Medications   Medication Sig     amLODIPine (NORVASC) 2.5 MG tablet Take 1 tablet (2.5 mg) by mouth daily    diclofenac (VOLTAREN) 1 % topical gel Apply up to 2 grams of 1% gel to upper extremity and up to 4 grams of 1% gel to lower extremity up to 4 times daily as needed for joint pain.  Use for hand and knee osteoarthritis    leflunomide (ARAVA) 10 MG tablet Take 1 tablet (10 mg) by mouth daily .  Labs required every 3 months.    lisinopril (ZESTRIL) 10 MG tablet     sulfaSALAzine ER (AZULFIDINE EN) 500 MG EC tablet Take 2 tablets (1,000 mg) by mouth 2 times daily .  Labs required every 3 months.     No current facility-administered medications for this visit.         Social History   See HPI    Family History     Denies family history of rheumatologic disease    Physical Exam     Temp Readings from Last 3 Encounters:   12/17/18 98  F (36.7  C) (Oral)     BP Readings from Last 5 Encounters:   04/18/22 130/86   11/22/19 130/88   08/02/19 134/80   05/10/19 136/88   12/17/18 (!) 156/98     Pulse Readings from Last 1 Encounters:   04/18/22 87     Resp Readings from Last 1 Encounters:   08/06/18 16     There is no height or weight on file to calculate BMI.      GEN: NAD. Healthy appearing adult.   HEENT:  Anicteric, noninjected sclera. No obvious external lesions of the ear and nose. Hearing intact.  PULM: No increased work of breathing.   MSK: Asymmetric hypertrophy at the left thumb IP joint; Heberden's nodes; no other swelling of the hands.  No overlying erythema of the hands.    SKIN: No rash or jaundice seen  PSYCH: Alert. Appropriate.       Labs / Imaging (select studies)   [facility] labs on 12/3/2012: HLA-B27 positive    8/8/2022 [facility] labs okay  11/2/2022 [facility] labs were okay  5/9/2023 [facility] labs were okay  Reportedly labs were completed earlier this week: Waiting for results to arrive    RF/CCP  Recent Labs   Lab Test 08/06/18  0929   CCPIGG 1   RHF <20     CBC  Recent Labs   Lab Test 05/16/22  1058 03/07/22  0956  10/20/21  1539 02/08/21  1056 10/19/20  1055   WBC 4.7 4.8 5.6 4.6 5.0   RBC 5.00 5.32 4.87 5.42 5.43   HGB 14.7 15.6 14.5 16.1 16.0   HCT 44.0 46.0 42.6 47.2 46.7   MCV 88 87 88 87 86   RDW 12.9 12.9 12.0 13.3 12.9    317 311 269 265   MCH 29.4 29.3 29.8 29.7 29.5   MCHC 33.4 33.9 34.0 34.1 34.3   NEUTROPHIL 54 53 56 52.3 56.6   LYMPH 21 21 18 22.2 22.2   MONOCYTE 19 19 23 19.3 17.8   EOSINOPHIL 5 6 2 5.3 2.6   BASOPHIL 1 1 1 0.9 0.8   ANEU  --   --  3.1 2.4 2.8   ALYM  --   --  1.0 1.0 1.1   AURORA  --   --  1.3 0.9 0.9   AEOS  --   --  0.1 0.2 0.1   ABAS  --   --  0.1 0.0 0.0   ANEUTAUTO 2.5 2.6  --   --   --    ALYMPAUTO 1.0 1.0  --   --   --    AMONOAUTO 0.9 0.9  --   --   --    AEOSAUTO 0.2 0.3  --   --   --    ABSBASO 0.1 0.1  --   --   --      CMP  Recent Labs   Lab Test 05/16/22  1058 03/07/22  0956 10/20/21  1539 02/08/21  1056 10/19/20  1055 07/07/20  1350 09/10/18  1101 08/06/18  0929   NA  --   --   --   --   --   --   --  137   POTASSIUM  --   --   --   --   --   --   --  4.7   CHLORIDE  --   --   --   --   --   --   --  104   CO2  --   --   --   --   --   --   --  25   ANIONGAP  --   --   --   --   --   --   --  8   GLC  --   --   --   --   --   --   --  84   BUN  --   --   --   --   --   --   --  14   CR 0.80 0.82 0.95 1.04 0.88 0.89   < > 0.82   GFRESTIMATED >90 >90 >90 82 >90 >90   < > >90   GFRESTBLACK  --   --   --  >90 >90 >90   < > >90   NICOLA  --   --   --   --   --   --   --  8.8   BILITOTAL 0.6 0.5 0.5 0.9 1.4* 0.8   < > 0.5   ALBUMIN 3.7 3.8 3.5 3.8 4.0 3.7   < > 3.8   PROTTOTAL 7.1 7.4 7.0 7.3 7.8 7.3   < > 7.7   ALKPHOS 50 50 58 49 55 48   < > 70   AST 23 26 35 29 28 24   < > 24   ALT 34 33 49 37 40 38   < > 41    < > = values in this interval not displayed.     Calcium/VitaminD  Recent Labs   Lab Test 08/06/18  0929   NICOLA 8.8     ESR/CRP  Recent Labs   Lab Test 03/07/22  0956 10/20/21  1539 10/19/20  1055   SED 4 7 4   CRP <2.9 7.4 <2.9     Hepatitis B  Recent Labs   Lab Test  08/06/18  0929   HBCAB Nonreactive   HEPBANG Nonreactive     Hepatitis C  Recent Labs   Lab Test 08/06/18  0929   HCVAB Nonreactive     Lyme ab screening  Recent Labs   Lab Test 08/06/18  0929   LYMEGM 0.06       Tuberculosis Screening  Recent Labs   Lab Test 08/06/18  0929   TBRES Negative         Immunization History     Immunization History   Administered Date(s) Administered    COVID-19 Bivalent 12+ (Pfizer) 10/04/2022    COVID-19 MONOVALENT 12+ (Pfizer) 03/21/2021, 04/11/2021, 10/18/2021    COVID-19 Monovalent 18+ (Moderna) 04/25/2022    Pneumo Conj 13-V (2010&after) 12/17/2018    Pneumococcal 23 valent 05/10/2019          Chart documentation done in part with Dragon Voice recognition Software. Although reviewed after completion, some word and grammatical error may remain.      Video-Visit Details    Type of service:  Video Visit    Video Start Time: 7:52 AM    Video End Time: 8:02 AM    Originating Location (pt. Location): [place], [place]    Distant Location (provider location):  off site, [place]    Platform used for Video Visit: Jose Juan Bunn MD

## 2023-08-18 ENCOUNTER — VIRTUAL VISIT (OUTPATIENT)
Dept: RHEUMATOLOGY | Facility: CLINIC | Age: 53
End: 2023-08-18
Payer: COMMERCIAL

## 2023-08-18 DIAGNOSIS — H20.00 HLA-B27 ASSOCIATED ACUTE ANTERIOR UVEITIS: ICD-10-CM

## 2023-08-18 DIAGNOSIS — M17.0 PRIMARY OSTEOARTHRITIS OF BOTH KNEES: ICD-10-CM

## 2023-08-18 DIAGNOSIS — M19.042 PRIMARY OSTEOARTHRITIS OF LEFT HAND: ICD-10-CM

## 2023-08-18 DIAGNOSIS — Z79.899 HIGH RISK MEDICATIONS (NOT ANTICOAGULANTS) LONG-TERM USE: ICD-10-CM

## 2023-08-18 DIAGNOSIS — M06.4 INFLAMMATORY POLYARTHROPATHY (H): Primary | ICD-10-CM

## 2023-08-18 PROCEDURE — 99214 OFFICE O/P EST MOD 30 MIN: CPT | Mod: VID | Performed by: INTERNAL MEDICINE

## 2023-08-18 RX ORDER — LEFLUNOMIDE 10 MG/1
10 TABLET ORAL DAILY
Qty: 90 TABLET | Refills: 0 | Status: SHIPPED | OUTPATIENT
Start: 2023-08-18 | End: 2023-11-16

## 2023-08-18 RX ORDER — SULFASALAZINE 500 MG/1
1000 TABLET, DELAYED RELEASE ORAL 2 TIMES DAILY
Qty: 360 TABLET | Refills: 0 | Status: SHIPPED | OUTPATIENT
Start: 2023-08-18 | End: 2024-01-10

## 2023-08-18 NOTE — PROGRESS NOTES
RICHARD Chau: Please fax the lab orders to [facility] in [place], [place]    Thank you,  Charles Bunn MD  8/18/2023

## 2023-11-06 ENCOUNTER — TELEPHONE (OUTPATIENT)
Dept: RHEUMATOLOGY | Facility: CLINIC | Age: 53
End: 2023-11-06
Payer: COMMERCIAL

## 2023-11-06 NOTE — TELEPHONE ENCOUNTER
[facility] Call Center    Phone Message    May a detailed message be left on voicemail: yes     Reason for Call: Order(s): Other:     Reason for requested: Patient states [facility] has not received any lab orders. Patient is wanting the orders to be resent to the [facility] to be able to schedule the lab appt. Patient states he could not find the fax number but has a phone number: [phone]. Please advise    Date needed: asap    Provider name: Oni    Action Taken: Message routed to:  [facility] Rheum    Travel Screening: Not Applicable

## 2023-11-08 LAB
ALT SERPL-CCNC: 38 U/L (ref 12–78)
AST SERPL-CCNC: 31 U/L (ref 10–40)
CREATININE (EXTERNAL): 0.9 MG/DL (ref 0.8–1.5)
GFR ESTIMATED (EXTERNAL): >90 ML/MIN/1.73M2

## 2023-11-16 DIAGNOSIS — H20.00 HLA-B27 ASSOCIATED ACUTE ANTERIOR UVEITIS: ICD-10-CM

## 2023-11-16 DIAGNOSIS — M06.4 INFLAMMATORY POLYARTHROPATHY (H): ICD-10-CM

## 2023-11-16 NOTE — TELEPHONE ENCOUNTER
Requested Prescriptions   Pending Prescriptions Disp Refills    leflunomide (ARAVA) 10 MG tablet 90 tablet 0     Sig: Take 1 tablet (10 mg) by mouth daily .  Labs required every 3 months.       There is no refill protocol information for this order

## 2023-11-20 ENCOUNTER — TRANSFERRED RECORDS (OUTPATIENT)
Dept: HEALTH INFORMATION MANAGEMENT | Facility: CLINIC | Age: 53
End: 2023-11-20

## 2023-11-20 NOTE — TELEPHONE ENCOUNTER
Requested Prescriptions   Pending Prescriptions Disp Refills    leflunomide (ARAVA) 10 MG tablet 90 tablet 0     Sig: Take 1 tablet (10 mg) by mouth daily .  Labs required every 3 months.       There is no refill protocol information for this order        Routing refill request to provider for review/approval because:  Drug not on the [facility] refill protocol   LOV 8/18/23  Next 2/16/24  Labs due early Nov, are completed outside    Ernestina Hernandez RN 11/20/2023 9:16 AM

## 2023-11-24 RX ORDER — LEFLUNOMIDE 10 MG/1
10 TABLET ORAL DAILY
Qty: 90 TABLET | Refills: 0 | Status: SHIPPED | OUTPATIENT
Start: 2023-11-24 | End: 2024-02-16

## 2024-01-10 ENCOUNTER — MYC REFILL (OUTPATIENT)
Dept: RHEUMATOLOGY | Facility: CLINIC | Age: 54
End: 2024-01-10
Payer: COMMERCIAL

## 2024-01-10 DIAGNOSIS — H20.00 HLA-B27 ASSOCIATED ACUTE ANTERIOR UVEITIS: ICD-10-CM

## 2024-01-10 DIAGNOSIS — M06.4 INFLAMMATORY POLYARTHROPATHY (H): ICD-10-CM

## 2024-01-18 NOTE — TELEPHONE ENCOUNTER
Call received from pharmacy to check on the status of this request, informed them pt's refill is still in-process. They are hoping this medication can be sent in right away, he is almost completely out at this point (also does not understand why there has been such a delay-pt's initial refill request was 8 days ago).

## 2024-01-22 RX ORDER — SULFASALAZINE 500 MG/1
1000 TABLET, DELAYED RELEASE ORAL 2 TIMES DAILY
Qty: 360 TABLET | Refills: 0 | Status: SHIPPED | OUTPATIENT
Start: 2024-01-22 | End: 2024-02-16

## 2024-01-22 NOTE — TELEPHONE ENCOUNTER
Called [facility] and requested labs from [facility] for refill.    KESLIE Ardon RN 1/22/2024 11:58 AM

## 2024-02-16 ENCOUNTER — VIRTUAL VISIT (OUTPATIENT)
Dept: RHEUMATOLOGY | Facility: CLINIC | Age: 54
End: 2024-02-16
Payer: COMMERCIAL

## 2024-02-16 DIAGNOSIS — H20.00 HLA-B27 ASSOCIATED ACUTE ANTERIOR UVEITIS: Primary | ICD-10-CM

## 2024-02-16 DIAGNOSIS — Z79.899 HIGH RISK MEDICATIONS (NOT ANTICOAGULANTS) LONG-TERM USE: ICD-10-CM

## 2024-02-16 DIAGNOSIS — M06.4 INFLAMMATORY POLYARTHROPATHY (H): ICD-10-CM

## 2024-02-16 DIAGNOSIS — F17.200 TOBACCO USE DISORDER: ICD-10-CM

## 2024-02-16 PROCEDURE — 99214 OFFICE O/P EST MOD 30 MIN: CPT | Mod: 95 | Performed by: INTERNAL MEDICINE

## 2024-02-16 PROCEDURE — G2211 COMPLEX E/M VISIT ADD ON: HCPCS | Mod: 95 | Performed by: INTERNAL MEDICINE

## 2024-02-16 RX ORDER — LEFLUNOMIDE 10 MG/1
10 TABLET ORAL DAILY
Qty: 90 TABLET | Refills: 2 | Status: SHIPPED | OUTPATIENT
Start: 2024-02-16 | End: 2024-08-29

## 2024-02-16 RX ORDER — SULFASALAZINE 500 MG/1
1000 TABLET, DELAYED RELEASE ORAL 2 TIMES DAILY
Qty: 360 TABLET | Refills: 2 | Status: SHIPPED | OUTPATIENT
Start: 2024-02-16 | End: 2024-08-29

## 2024-02-16 NOTE — PROGRESS NOTES
Lab orders faxed to [facility] at [phone]. Confirmed with patient this was correct location. Fax sent successfully.    Saritha ROBERTS, Specialty RN 2/16/2024 9:23 AM

## 2024-02-16 NOTE — PROGRESS NOTES
Saritha, will you please fax the lab order to  [facility] in [place], [place]    Thank you!  Charles Bunn MD  2/16/2024

## 2024-02-16 NOTE — PATIENT INSTRUCTIONS
RHEUMATOLOGY    [facility]  [address] [place] [address]  [place] [address]    Phone number: [phone]  Fax number: [phone]    If you need a medication refill, please contact us as you may need lab work and/or a follow up visit prior to your refill.      Thank you for choosing [facility]!    Theresa Clay CMA Rheumatology

## 2024-02-16 NOTE — PROGRESS NOTES
Bry Bignham  is a 53 year old year old who is being evaluated via a billable video visit.      How would you like to obtain your AVS? MyChart  If the video visit is dropped, the invitation should be resent by: Text to cell phone: [phone]   Will anyone else be joining your video visit? No      Rheumatology Video Visit      Bry Bingham MRN# [id number]   YOB: 1970 Age: 53 year old      Date of visit: 2/16/24   Ophthalmologist: Dr. Luis Eduardo Young from [facility]   PCP: Hood Melo at [facility]    Chief Complaint   Patient presents with:  Inflammatory polyarthropathy    Assessment and Plan     1.  HLA-B27 associated recurrent left anterior uveitis: History of acute onset per ophthalmologist.  HLA-B27 positive.  History of rheumatoid arthritis per patient report but no active synovitis seen on initial exam.   Sudden onset unilateral anterior uveitis was suggestive of an HLA-B27 association.  Reportedly had uveitis since 2009; was tx'd with MTX successfully for many years but he stopped it because of associated fatigue; did well off tx for 3-4 years until the uveitis flared and required steroid eye drops twice daily only partially controlled the left eye symptoms.    Leflunomide 20 mg daily was started with good benefit but liver enzymes elevated so it was reduced to leflunomide 10 mg daily.  He then presented with symmetric synovitis of the MCPs, so sulfasalazine was added with resolution of his inflammatory arthritis.  Currently on leflunomide 10mg daily and sulfasalazine 1000 mg twice daily.  Doing well with good control of arthritis and uveitis.  No longer with fatigue.  If needed in the future could consider addition of a TNF inhibitor or sulfasalazine dose increase.  Chronic illness, stable.     - Continue sulfasalazine 1000mg BID     - Continue leflunomide 10mg daily  (historically had LFT elevation with 20 mg daily)  - Labs in early February 2024: CBC, Creatinine,  Hepatic Panel, ESR, CRP  (lab orders previously faxed to [facility] in [place], [place]; he says that he has an appointment for labs next week)  - Labs in early May 2024: CBC, Creatinine, Hepatic Panel (lab orders to be faxed to [facility] in [place], [place])  - Labs in early August 2024: CBC, Creatinine, Hepatic Panel, ESR, CRP (lab orders to be faxed to [facility] in [place], [place])    High risk medication requiring intensive toxicity monitoring at least quarterly     2. 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 See #1.  Controlled on sulfasalazine and leflunomide.  Chronic illness, stable.      3. Chewing tobacco: Advised complete cessation     4.  Bilateral knee osteoarthritis and hand osteoarthritis:  Heberden's and Gwen's nodes present.    Topical Voltaren gel as needed; this is effective.  Encouraged physical therapy exercises for his knees on a daily basis.  Doing well at this time.    5.  History of fatigue: No longer having fatigue, per patient    6.  Vaccinations: Vaccinations reviewed with Mr. Bingham.  Risks and benefits of vaccinations were discussed.    - Influenza: encouraged yearly vaccination  - [id number]: up to date  - [id number]: up to date  - Shingrix: up to date  - COVID-19: Advised keeping updated, and to hold sulfasalazine and leflunomide for 1-2 weeks afterward     Total minutes spent in evaluation with patient, documentation, , and review of pertinent studies and chart notes: 18  The longitudinal plan of care for the rheumatology problem(s) were addressed during this visit.  Due to added complexity of care, we will continue to support the patient and the subsequent management of this condition with ongoing continuity of care.        Mr. Bingham verbalized agreement with and understanding of the rational for the diagnosis and treatment plan.  All questions were answered to best of my ability and the patient's satisfaction.  Mr. Bingham was advised to contact the clinic with any questions that may arise after the clinic visit.      Thank you for involving me in the care of the patient    Return to clinic: 6 months    HPI   Bry Bingham is a 53 year old male with a past medical history significant for iritis and rheumatoid arthritis who is seen for follow-up of recurrent left acute anterior uveitis.    2/20/2023: Bony hypertrophy on the dorsal aspect of the left thumb IP joint that is without increased warmth or overlying erythema, sometimes bothersome with increased activity, and improves with rest.  Other joints are doing well.  Knee pain responded well to topical Voltaren gel as needed, used infrequently.  Hypertension now also treated with amlodipine 2.5 mg daily that was started by his primary care provider with now good control of the hypertension per patient.  Morning stiffness for no more than 10 minutes.  No joint swelling.  No recurrence of uveitis.  Reports that he is happy with how well he is doing.  No longer having fatigue.    8/18/2023: Thumb osteoarthritis improved with Voltaren gel and stretching exercises.  Other joints are doing well.  No other joint pain.  Morning stiffness for no more than 10 minutes.  No stomach upset associated with sulfasalazine or leflunomide.  No fatigue.  States that he saw his ophthalmologist within the last month and reports that the uveitis is quiescent; and is not requiring eyedrops.  No dry eye or dry mouth.  Overall states that he is happy with how well he is doing.  Labs were done earlier this week; awaiting results to be sent to me.    Today, 2/16/2024: Currently doing well.  Thumb IP Bony hypertrophy with pain when more active, rest resolves pain; minimal symptoms; topical Voltaren gel of minimal benefit.  No other joint pain or swelling.  No morning stiffness or gelling phenomenon.  No recurrence of uveitis.  Planning to have labs completed next week.  Still chewing tobacco with no  plans for cessation.    Denies fevers, chills, nausea, vomiting, constipation, diarrhea. No abdominal pain. No chest pain/pressure, palpitations, or shortness of breath. No LE swelling. No neck pain.  No lower back pain or lower back stiffness.  No oral or nasal sores.  No rash.    Tobacco: Chewing tobacco  EtOH: 1-2 drinks every 2 weeks  Drugs: None  Occupation:  for [email or web address]   12 point review of system was completed and negative except as noted in the HPI     Active Problem List     Patient Active Problem List   Diagnosis    HLA-B27 associated acute anterior uveitis     Past Medical History   History reviewed. No pertinent past medical history.  Past Surgical History   History reviewed. No pertinent surgical history.  Allergy   No Known Allergies  Current Medication List     Current Outpatient Medications   Medication Sig    amLODIPine (NORVASC) 2.5 MG tablet Take 1 tablet (2.5 mg) by mouth daily    leflunomide (ARAVA) 10 MG tablet Take 1 tablet (10 mg) by mouth daily .  Labs required every 3 months.    lisinopril (ZESTRIL) 10 MG tablet     sulfaSALAzine ER (AZULFIDINE EN) 500 MG EC tablet Take 2 tablets (1,000 mg) by mouth 2 times daily .  Labs required every 3 months.    diclofenac (VOLTAREN) 1 % topical gel Apply up to 2 grams of 1% gel to upper extremity and up to 4 grams of 1% gel to lower extremity up to 4 times daily as needed for joint pain.  Use for hand and knee osteoarthritis     No current facility-administered medications for this visit.         Social History   See [facility]    Family History     Denies family history of rheumatologic disease    Physical Exam     Temp Readings from Last 3 Encounters:   12/17/18 98  F (36.7  C) (Oral)     BP Readings from Last 5 Encounters:   04/18/22 130/86   11/22/19 130/88   08/02/19 134/80   05/10/19 136/88   12/17/18 (!) 156/98     Pulse Readings from Last 1 Encounters:   04/18/22 87     Resp Readings from Last 1 Encounters:   08/06/18 16     There  is no height or weight on file to calculate BMI.      GEN: NAD. Healthy appearing adult.   HEENT:  Anicteric, noninjected sclera. No obvious external lesions of the ear and nose. Hearing intact.  PULM: No increased work of breathing.   MSK: Bony hypertrophy at the left thumb IP joint; Heberden's nodes; no other swelling of the hands.  No overlying erythema of the hands.    SKIN: No rash or jaundice seen  PSYCH: Alert. Appropriate.       Labs / Imaging (select studies)   [facility] labs on 12/3/2012: HLA-B27 positive    8/8/2022 [facility] labs okay  11/2/2022 [facility] labs were okay  5/9/2023 [facility] labs were okay  11/22/2023 [facility] labs: QuantiFERON-TB gold plus negative  11/8/2023: [facility] labs: Creatinine, AST, ALT normal.  CBC normal    RF/CCP  Recent Labs   Lab Test 08/06/18  0929   CCPIGG 1   RHF <20     CBC  Recent Labs   Lab Test 05/16/22  1058 03/07/22  0956 10/20/21  1539 02/08/21  1056 10/19/20  1055   WBC 4.7 4.8 5.6 4.6 5.0   RBC 5.00 5.32 4.87 5.42 5.43   HGB 14.7 15.6 14.5 16.1 16.0   HCT 44.0 46.0 42.6 47.2 46.7   MCV 88 87 88 87 86   RDW 12.9 12.9 12.0 13.3 12.9    317 311 269 265   MCH 29.4 29.3 29.8 29.7 29.5   MCHC 33.4 33.9 34.0 34.1 34.3   NEUTROPHIL 54 53 56 52.3 56.6   LYMPH 21 21 18 22.2 22.2   MONOCYTE 19 19 23 19.3 17.8   EOSINOPHIL 5 6 2 5.3 2.6   BASOPHIL 1 1 1 0.9 0.8   ANEU  --   --  3.1 2.4 2.8   ALYM  --   --  1.0 1.0 1.1   AURORA  --   --  1.3 0.9 0.9   AEOS  --   --  0.1 0.2 0.1   ABAS  --   --  0.1 0.0 0.0   ANEUTAUTO 2.5 2.6  --   --   --    ALYMPAUTO 1.0 1.0  --   --   --    AMONOAUTO 0.9 0.9  --   --   --    AEOSAUTO 0.2 0.3  --   --   --    ABSBASO 0.1 0.1  --   --   --      [facility]  Recent Labs   Lab Test 05/16/22  1058 03/07/22  0956 10/20/21  1539 02/08/21  1056 10/19/20  1055 07/07/20  1350 09/10/18  1101 08/06/18  0929   NA  --   --   --   --   --   --   --  137   POTASSIUM  --   --   --   --   --   --   --  4.7   CHLORIDE  --   --    --   --   --   --   --  104   CO2  --   --   --   --   --   --   --  25   ANIONGAP  --   --   --   --   --   --   --  8   GLC  --   --   --   --   --   --   --  84   BUN  --   --   --   --   --   --   --  14   CR 0.80 0.82 0.95 1.04 0.88 0.89   < > 0.82   GFRESTIMATED >90 >90 >90 82 >90 >90   < > >90   GFRESTBLACK  --   --   --  >90 >90 >90   < > >90   NICOLA  --   --   --   --   --   --   --  8.8   BILITOTAL 0.6 0.5 0.5 0.9 1.4* 0.8   < > 0.5   ALBUMIN 3.7 3.8 3.5 3.8 4.0 3.7   < > 3.8   PROTTOTAL 7.1 7.4 7.0 7.3 7.8 7.3   < > 7.7   ALKPHOS 50 50 58 49 55 48   < > 70   AST 23 26 35 29 28 24   < > 24   ALT 34 33 49 37 40 38   < > 41    < > = values in this interval not displayed.     Calcium/VitaminD  Recent Labs   Lab Test 08/06/18  0929   NICOLA 8.8     ESR/CRP  Recent Labs   Lab Test 03/07/22  0956 10/20/21  1539 10/19/20  1055   SED 4 7 4   CRP <2.9 7.4 <2.9     Hepatitis B  Recent Labs   Lab Test 08/06/18  0929   HBCAB Nonreactive   HEPBANG Nonreactive     Hepatitis C  Recent Labs   Lab Test 08/06/18  0929   HCVAB Nonreactive     Lyme ab screening  Recent Labs   Lab Test 08/06/18  0929   LYMEGM 0.06     Tuberculosis Screening  Recent Labs   Lab Test 08/06/18  0929   TBRES Negative     Immunization History     Immunization History   Administered Date(s) Administered    COVID-19 12+ (2023-24) (Pfizer) 10/27/2023    COVID-19 Bivalent 12+ (Pfizer) 10/04/2022    COVID-19 MONOVALENT 12+ (Pfizer) 03/21/2021, 04/11/2021, 10/18/2021    COVID-19 Monovalent 18+ (Moderna) 04/25/2022    Pneumo Conj 13-V (2010&after) 12/17/2018    Pneumococcal 23 valent 05/10/2019          Chart documentation done in part with Dragon Voice recognition Software. Although reviewed after completion, some word and grammatical error may remain.    Video-Visit Details    Type of service:  Video Visit    Video Start Time: 7:42 AM    Video End Time: 7:50 AM    Originating Location (pt. Location): [place], [place]    Distant Location (provider location):  off  site, [place]    Platform used for Video Visit: Jose Juan Bunn MD

## 2024-02-19 ENCOUNTER — TELEPHONE (OUTPATIENT)
Dept: RHEUMATOLOGY | Facility: CLINIC | Age: 54
End: 2024-02-19
Payer: COMMERCIAL

## 2024-02-19 NOTE — TELEPHONE ENCOUNTER
[facility] Call Center    Phone Message    May a detailed message be left on voicemail: yes     Reason for Call: Order(s): Other:     Reason for requested: Patient states he is having issues with getting blood drawn at the [facility]. Patient states he is supposed to have the blood drawn this week and they are stating they do not have the order for it. Patient states it is in regards to his Rheumatoid medication and is not sure of what orders he is needing but states he always gets the same test done for the medication. Patient is wanting to have the orders sent over and would like to get a call back when this has been done. Please advise.     Date needed: asap    Provider name: Oni        Action Taken: Message routed to:  [facility] ([facility]): Rheum    Travel Screening: Not Applicable

## 2024-02-19 NOTE — TELEPHONE ENCOUNTER
Called patient. Refaxed orders. Patient confirmed understanding.     Osiris GROSSMAN RN, [facility] 02/19/24 9:47 AM

## 2024-02-21 ENCOUNTER — TRANSFERRED RECORDS (OUTPATIENT)
Dept: HEALTH INFORMATION MANAGEMENT | Facility: CLINIC | Age: 54
End: 2024-02-21
Payer: COMMERCIAL

## 2024-02-21 LAB
ALT SERPL-CCNC: 36 U/L (ref 12–78)
AST SERPL-CCNC: 26 U/L (ref 10–40)
CREATININE (EXTERNAL): 1 MG/DL (ref 0.8–1.5)
GFR ESTIMATED (EXTERNAL): 89 ML/MIN/1.73M2

## 2024-04-29 ENCOUNTER — TELEPHONE (OUTPATIENT)
Dept: RHEUMATOLOGY | Facility: CLINIC | Age: 54
End: 2024-04-29
Payer: COMMERCIAL

## 2024-04-29 NOTE — TELEPHONE ENCOUNTER
[facility] Call Center    Phone Message    May a detailed message be left on voicemail: yes     Reason for Call: Order(s): Other:   Reason for requested: Patient is requesting lab orders to be sent to [facility] in [place], [place]. States he has an appt there today at 3pm, and is hoping they can be sent ASAP. Please follow-up as needed. Thank you.   Date needed: ASAP  Provider name: Dr. Bunn    Action Taken: Other: Rheum    Travel Screening: Not Applicable

## 2024-04-29 NOTE — TELEPHONE ENCOUNTER
Verifying fax number to send orders to   Fax# [phone]    Phone: [phone] Please call to get sorted out if possible.

## 2024-04-30 NOTE — TELEPHONE ENCOUNTER
Caller Carmen states that their you system shows orders tried to come through at the phone number       Please Resend again to fax# to [phone]

## 2024-05-02 ENCOUNTER — TRANSFERRED RECORDS (OUTPATIENT)
Dept: HEALTH INFORMATION MANAGEMENT | Facility: CLINIC | Age: 54
End: 2024-05-02
Payer: COMMERCIAL

## 2024-06-16 ENCOUNTER — HEALTH MAINTENANCE LETTER (OUTPATIENT)
Age: 54
End: 2024-06-16

## 2024-08-29 ENCOUNTER — ANCILLARY PROCEDURE (OUTPATIENT)
Dept: GENERAL RADIOLOGY | Facility: CLINIC | Age: 54
End: 2024-08-29
Attending: INTERNAL MEDICINE
Payer: COMMERCIAL

## 2024-08-29 ENCOUNTER — OFFICE VISIT (OUTPATIENT)
Dept: RHEUMATOLOGY | Facility: CLINIC | Age: 54
End: 2024-08-29
Payer: COMMERCIAL

## 2024-08-29 VITALS
SYSTOLIC BLOOD PRESSURE: 123 MMHG | HEART RATE: 81 BPM | DIASTOLIC BLOOD PRESSURE: 83 MMHG | RESPIRATION RATE: 16 BRPM | OXYGEN SATURATION: 96 % | WEIGHT: 187 LBS

## 2024-08-29 DIAGNOSIS — F17.200 TOBACCO USE DISORDER: ICD-10-CM

## 2024-08-29 DIAGNOSIS — Z23 NEED FOR VACCINATION: ICD-10-CM

## 2024-08-29 DIAGNOSIS — H20.00 HLA-B27 ASSOCIATED ACUTE ANTERIOR UVEITIS: Primary | ICD-10-CM

## 2024-08-29 DIAGNOSIS — H20.00 HLA-B27 ASSOCIATED ACUTE ANTERIOR UVEITIS: ICD-10-CM

## 2024-08-29 DIAGNOSIS — M06.09 RHEUMATOID ARTHRITIS OF MULTIPLE SITES WITH NEGATIVE RHEUMATOID FACTOR (H): ICD-10-CM

## 2024-08-29 DIAGNOSIS — M06.4 INFLAMMATORY POLYARTHROPATHY (H): ICD-10-CM

## 2024-08-29 PROCEDURE — 36415 COLL VENOUS BLD VENIPUNCTURE: CPT | Performed by: INTERNAL MEDICINE

## 2024-08-29 PROCEDURE — 86704 HEP B CORE ANTIBODY TOTAL: CPT | Performed by: INTERNAL MEDICINE

## 2024-08-29 PROCEDURE — 90471 IMMUNIZATION ADMIN: CPT | Performed by: INTERNAL MEDICINE

## 2024-08-29 PROCEDURE — 71046 X-RAY EXAM CHEST 2 VIEWS: CPT | Mod: TC | Performed by: RADIOLOGY

## 2024-08-29 PROCEDURE — 86803 HEPATITIS C AB TEST: CPT | Performed by: INTERNAL MEDICINE

## 2024-08-29 PROCEDURE — 90715 TDAP VACCINE 7 YRS/> IM: CPT | Performed by: INTERNAL MEDICINE

## 2024-08-29 PROCEDURE — 99214 OFFICE O/P EST MOD 30 MIN: CPT | Mod: 25 | Performed by: INTERNAL MEDICINE

## 2024-08-29 PROCEDURE — 86481 TB AG RESPONSE T-CELL SUSP: CPT | Performed by: INTERNAL MEDICINE

## 2024-08-29 PROCEDURE — 87340 HEPATITIS B SURFACE AG IA: CPT | Performed by: INTERNAL MEDICINE

## 2024-08-29 RX ORDER — LEFLUNOMIDE 10 MG/1
10 TABLET ORAL DAILY
Qty: 90 TABLET | Refills: 2 | Status: SHIPPED | OUTPATIENT
Start: 2024-08-29

## 2024-08-29 RX ORDER — SULFASALAZINE 500 MG/1
1000 TABLET, DELAYED RELEASE ORAL 2 TIMES DAILY
Qty: 360 TABLET | Refills: 2 | Status: SHIPPED | OUTPATIENT
Start: 2024-08-29

## 2024-08-29 NOTE — NURSING NOTE
RAPID3 (0-30) Cumulative Score  2.0          RAPID3 Weighted Score (divide #4 by 3 and that is the weighted score)  0.6

## 2024-08-29 NOTE — PATIENT INSTRUCTIONS
RHEUMATOLOGY    [facility]  [address] [place] [address]  [place] [address]    Phone number: [phone]  Fax number: [phone]    If you need a medication refill, please contact us as you may need lab work and/or a follow up visit prior to your refill.      Thank you for choosing [facility]!    Theresa Clay CMA Rheumatology

## 2024-08-29 NOTE — PROGRESS NOTES
Rheumatology Clinic Visit      Bry Bingham MRN# [id number]   YOB: 1970 Age: 54 year old      Date of visit: 8/29/24   Ophthalmologist: Dr. Luis Eduardo Young from [facility]   PCP: Hood Melo at [facility]    Chief Complaint   Patient presents with:   HLA-B27 associated recurrent left anterior uveitis: Hands are bothering him    Assessment and Plan     1.  HLA-B27 associated recurrent left anterior uveitis: History of acute onset per ophthalmologist.  HLA-B27 positive.  History of rheumatoid arthritis per patient report but no active synovitis seen on initial exam.   Sudden onset unilateral anterior uveitis was suggestive of an HLA-B27 association.  Reportedly had uveitis since 2009; was tx'd with MTX successfully for many years but he stopped it because of associated fatigue; did well off tx for 3-4 years until the uveitis flared and required steroid eye drops twice daily only partially controlled the left eye symptoms.  Leflunomide 20 mg daily was started with good benefit but liver enzymes elevated so it was reduced to leflunomide 10 mg daily.  He then presented with symmetric synovitis of the MCPs, so sulfasalazine was added with resolution of his inflammatory arthritis.  Currently on leflunomide 10mg daily and sulfasalazine 1000 mg twice daily.  Was doing well for a while but now with symmetric synovitis of the MCPs consistent with rheumatoid arthritis so escalating treatment by adding adalimumab.  Discussed Humira and biosimilars.  Because of mildly reduced white blood cell count on May 2024 labs would prefer adding a biologic DMARD rather than increasing sulfasalazine.  Patient is in agreement with plan.  Screening today.  Request results of the most recent labs that were performed at the [facility]).  Chronic illness     - Continue sulfasalazine 1000mg BID     - Continue leflunomide 10mg daily  (historically had LFT elevation with 20 mg  daily)  - Start adalimumab 40 mg SQ every 14 days if screening is okay  - Chest x-ray today  - Labs today: Hepatitis B core antibody and surface antigen, hepatitis C antibody, QuantiFERON-TB gold plus  - Labs in 3 months: CBC, Creatinine, Hepatic Panel, ESR, CRP (printed orders to be sent to [facility] in [place], [place], per patient preference who prefers to have his labs done there rather than [facility])    High risk medication requiring intensive toxicity monitoring at least quarterly    # Adalimumab (Humira and biosimilars) Risks and Benefits: The risks and benefits of adalimumab were discussed in detail and the patient verbalized understanding.  The risks discussed include, but are not limited to, the risk for hypersensitivity, anaphylaxis, anaphylactoid reactions, an increased risk for serious infections leading to hospitalization or death, a possible increased risk for lymphoma and other malignancies, a possible worsening of demyelinating diseases, a possible worsening of heart failure, risk for cytopenias, risk for drug induced lupus, possible reactivation of hepatitis B, and possible reactivation of latent tuberculosis.  Subcutaneous injections may result in injection site reactions and/or pain at the site of injection.  The most common adverse reactions are infections, injection site reactions, headache, and rash.  It was discussed that the medication would need to be discontinued if a serious infection develops.  It was discussed that live vaccinations should not be received while using adalimumab or within 30 days prior to starting adalimumab.  I encouraged reviewing the package insert and asking any questions about the medication.      2. Rheumatoid Arthritis: RA versus HLA-B27 SpA. See #1.  Controlled on sulfasalazine and leflunomide previously but more active disease today so adding adalimumab as noted in #1.  Chronic illness, progressive.        3. Chewing tobacco: Advised  complete cessation     4.  Bilateral knee osteoarthritis and hand osteoarthritis:  Heberden's and Gwen's nodes present.    Topical Voltaren gel as needed; this is effective.  Encouraged physical therapy exercises for his knees on a daily basis.  Doing well at this time.    5.  Vaccinations: Vaccinations reviewed with Mr. Bingham.  Risks and benefits of vaccinations were discussed.    - Influenza: encouraged yearly vaccination  - Ktsujnv38: up to date  - Thgdxohlb66: up to date  - Shingrix: up to date  - COVID-19: Advised keeping updated, and to hold sulfasalazine and leflunomide for 1-2 weeks afterward  - TDAP: To receive today     Total minutes spent in evaluation with patient, documentation, , and review of pertinent studies and chart notes: 22  The longitudinal plan of care for the rheumatology problem(s) were addressed during this visit.  Due to added complexity of care, we will continue to support the patient and the subsequent management of this condition with ongoing continuity of care.    Mr. Bingham verbalized agreement with and understanding of the rational for the diagnosis and treatment plan.  All questions were answered to best of my ability and the patient's satisfaction. Mr. Bingham was advised to contact the clinic with any questions that may arise after the clinic visit.      Thank you for involving me in the care of the patient    Return to clinic: 6 months    HPI   Bry Bingham is a 54 year old male with a past medical history significant for iritis and rheumatoid arthritis who is seen for follow-up of recurrent left acute anterior uveitis.    2/20/2023: Bony hypertrophy on the dorsal aspect of the left thumb IP joint that is without increased warmth or overlying erythema, sometimes bothersome with increased activity, and improves with rest.  Other joints are doing well.  Knee pain responded well to topical Voltaren gel as needed, used infrequently.  Hypertension now also treated with  amlodipine 2.5 mg daily that was started by his primary care provider with now good control of the hypertension per patient.  Morning stiffness for no more than 10 minutes.  No joint swelling.  No recurrence of uveitis.  Reports that he is happy with how well he is doing.  No longer having fatigue.    8/18/2023: Thumb osteoarthritis improved with Voltaren gel and stretching exercises.  Other joints are doing well.  No other joint pain.  Morning stiffness for no more than 10 minutes.  No stomach upset associated with sulfasalazine or leflunomide.  No fatigue.  States that he saw his ophthalmologist within the last month and reports that the uveitis is quiescent; and is not requiring eyedrops.  No dry eye or dry mouth.  Overall states that he is happy with how well he is doing.  Labs were done earlier this week; awaiting results to be sent to me.    2/16/2024: Currently doing well.  Thumb IP Bony hypertrophy with pain when more active, rest resolves pain; minimal symptoms; topical Voltaren gel of minimal benefit.  No other joint pain or swelling.  No morning stiffness or gelling phenomenon.  No recurrence of uveitis.  Planning to have labs completed next week.  Still chewing tobacco with no plans for cessation.    Today, 8/29/2024: More pain at the MCPs bilaterally, worse at the right second and left third MCPs.  MCP pain is worse in the morning and improves with time and activity.  Morning stiffness in the MCPs, PIPs, wrists, and ankles for at least 2 hours each morning.  Mild swelling at the MCPs.  No swelling of the ankles.  No recurrence of uveitis.    Denies fevers, chills, nausea, vomiting, constipation, diarrhea. No abdominal pain. No chest pain/pressure, palpitations, or shortness of breath. No LE swelling. No neck pain.  No lower back pain or lower back stiffness.  No oral or nasal sores.  No rash.    Tobacco: Chewing tobacco; trying to cut down  EtOH: 1-2 drinks every 2 weeks  Drugs: None  Occupation: Truck   for [employer]    ROS   12 point review of system was completed and negative except as noted in the HPI     Active Problem List     Patient Active Problem List   Diagnosis    HLA-B27 associated acute anterior uveitis     Past Medical History   History reviewed. No pertinent past medical history.  Past Surgical History   History reviewed. No pertinent surgical history.  Allergy   No Known Allergies  Current Medication List     Current Outpatient Medications   Medication Sig Dispense Refill    amLODIPine (NORVASC) 2.5 MG tablet Take 1 tablet (2.5 mg) by mouth daily      leflunomide (ARAVA) 10 MG tablet Take 1 tablet (10 mg) by mouth daily .  Labs required every 3 months. 90 tablet 2    lisinopril (ZESTRIL) 10 MG tablet       sulfaSALAzine ER (AZULFIDINE EN) 500 MG EC tablet Take 2 tablets (1,000 mg) by mouth 2 times daily .  Labs required every 3 months. 360 tablet 2    diclofenac (VOLTAREN) 1 % topical gel Apply up to 2 grams of 1% gel to upper extremity and up to 4 grams of 1% gel to lower extremity up to 4 times daily as needed for joint pain.  Use for hand and knee osteoarthritis 200 g 2     No current facility-administered medications for this visit.         Social History   See [facility]    Family History     Denies family history of rheumatologic disease    Physical Exam     Temp Readings from Last 3 Encounters:   12/17/18 98  F (36.7  C) (Oral)     BP Readings from Last 5 Encounters:   08/29/24 123/83   04/18/22 130/86   11/22/19 130/88   08/02/19 134/80   05/10/19 136/88     Pulse Readings from Last 1 Encounters:   08/29/24 81     Resp Readings from Last 1 Encounters:   08/29/24 16     There is no height or weight on file to calculate BMI.      GEN: NAD. Healthy appearing adult.   HEENT:  Anicteric, noninjected sclera. No obvious external lesions of the ear and nose. Hearing intact.  CV: S1, S2. RRR. No m/r/g  PULM: No increased work of breathing. CTA bilaterally   MSK: Synovial swelling and tenderness to  palpation of the bilateral 2nd-3rd MCPs, worse on the right second and left third MCPs; no increased warmth or overlying erythema.  PIPs and DIPs without swelling or tenderness to palpation.  Heberden's nodes present.    Wrists without swelling or tenderness to palpation.  Elbows and shoulders without swelling or tenderness to palpation.  Knees without swelling or tenderness to palpation.  Ankles mildly tender to palpation diffusely but no swelling, increased warmth, or overlying erythema.  Negative MTP squeeze bilaterally.  SKIN: No rash or jaundice seen  PSYCH: Alert. Appropriate.        Labs / Imaging (select studies)   [facility] labs on 12/3/2012: HLA-B27 positive    8/8/2022 [facility] labs okay  11/2/2022 [facility] labs were okay  5/9/2023 [facility] labs were okay  11/22/2023 [facility] labs: QuantiFERON-TB gold plus negative  11/8/2023: [facility] labs: Creatinine, AST, ALT normal.  CBC normal    RF/CCP  Recent Labs   Lab Test 08/06/18  0929   CCPIGG 1   RHF <20     CBC  Recent Labs   Lab Test 05/16/22  1058 03/07/22  0956 10/20/21  1539 02/08/21  1056 10/19/20  1055   WBC 4.7 4.8 5.6 4.6 5.0   RBC 5.00 5.32 4.87 5.42 5.43   HGB 14.7 15.6 14.5 16.1 16.0   HCT 44.0 46.0 42.6 47.2 46.7   MCV 88 87 88 87 86   RDW 12.9 12.9 12.0 13.3 12.9    317 311 269 265   MCH 29.4 29.3 29.8 29.7 29.5   MCHC 33.4 33.9 34.0 34.1 34.3   NEUTROPHIL 54 53 56 52.3 56.6   LYMPH 21 21 18 22.2 22.2   MONOCYTE 19 19 23 19.3 17.8   EOSINOPHIL 5 6 2 5.3 2.6   BASOPHIL 1 1 1 0.9 0.8   ANEU  --   --  3.1 2.4 2.8   ALYM  --   --  1.0 1.0 1.1   AURORA  --   --  1.3 0.9 0.9   AEOS  --   --  0.1 0.2 0.1   ABAS  --   --  0.1 0.0 0.0   ANEUTAUTO 2.5 2.6  --   --   --    ALYMPAUTO 1.0 1.0  --   --   --    AMONOAUTO 0.9 0.9  --   --   --    AEOSAUTO 0.2 0.3  --   --   --    ABSBASO 0.1 0.1  --   --   --      [facility]  Recent Labs   Lab Test 05/16/22  1058 03/07/22  0956 10/20/21  1539 02/08/21  1056 10/19/20  1055  07/07/20  1350 09/10/18  1101 08/06/18  0929   NA  --   --   --   --   --   --   --  137   POTASSIUM  --   --   --   --   --   --   --  4.7   CHLORIDE  --   --   --   --   --   --   --  104   CO2  --   --   --   --   --   --   --  25   ANIONGAP  --   --   --   --   --   --   --  8   GLC  --   --   --   --   --   --   --  84   BUN  --   --   --   --   --   --   --  14   CR 0.80 0.82 0.95 1.04 0.88 0.89   < > 0.82   GFRESTIMATED >90 >90 >90 82 >90 >90   < > >90   GFRESTBLACK  --   --   --  >90 >90 >90   < > >90   NICOLA  --   --   --   --   --   --   --  8.8   BILITOTAL 0.6 0.5 0.5 0.9 1.4* 0.8   < > 0.5   ALBUMIN 3.7 3.8 3.5 3.8 4.0 3.7   < > 3.8   PROTTOTAL 7.1 7.4 7.0 7.3 7.8 7.3   < > 7.7   ALKPHOS 50 50 58 49 55 48   < > 70   AST 23 26 35 29 28 24   < > 24   ALT 34 33 49 37 40 38   < > 41    < > = values in this interval not displayed.     Calcium/VitaminD  Recent Labs   Lab Test 08/06/18  0929   NICOLA 8.8     ESR/CRP  Recent Labs   Lab Test 03/07/22  0956 10/20/21  1539 10/19/20  1055   SED 4 7 4   CRP <2.9 7.4 <2.9     Hepatitis B  Recent Labs   Lab Test 08/06/18  0929   HBCAB Nonreactive   HEPBANG Nonreactive     Hepatitis C  Recent Labs   Lab Test 08/06/18  0929   HCVAB Nonreactive     Lyme ab screening  Recent Labs   Lab Test 08/06/18  0929   LYMEGM 0.06     Tuberculosis Screening  Recent Labs   Lab Test 08/06/18  0929   TBRES Negative     Immunization History     Immunization History   Administered Date(s) Administered    COVID-19 12+ (2023-24) (Pfizer) 10/27/2023    COVID-19 Bivalent 12+ (Pfizer) 10/04/2022    COVID-19 MONOVALENT 12+ (Pfizer) 03/21/2021, 04/11/2021, 10/18/2021    COVID-19 Monovalent 18+ (Moderna) 04/25/2022    Pneumo Conj 13-V (2010&after) 12/17/2018    Pneumococcal 23 valent 05/10/2019          Chart documentation done in part with Dragon Voice recognition Software. Although reviewed after completion, some word and grammatical error may remain.    Charles Bunn MD

## 2024-08-30 LAB
HBV CORE AB SERPL QL IA: NONREACTIVE
HBV SURFACE AG SERPL QL IA: NONREACTIVE
HCV AB SERPL QL IA: NONREACTIVE

## 2024-08-31 LAB
GAMMA INTERFERON BACKGROUND BLD IA-ACNC: 0.01 IU/ML
M TB IFN-G BLD-IMP: NEGATIVE
M TB IFN-G CD4+ BCKGRND COR BLD-ACNC: 9.99 IU/ML
MITOGEN IGNF BCKGRD COR BLD-ACNC: 0 IU/ML
MITOGEN IGNF BCKGRD COR BLD-ACNC: 0 IU/ML
QUANTIFERON MITOGEN: 10 IU/ML
QUANTIFERON NIL TUBE: 0.01 IU/ML
QUANTIFERON TB1 TUBE: 0.01 IU/ML
QUANTIFERON TB2 TUBE: 0.01

## 2024-09-15 DIAGNOSIS — M06.09 RHEUMATOID ARTHRITIS OF MULTIPLE SITES WITH NEGATIVE RHEUMATOID FACTOR (H): ICD-10-CM

## 2024-09-15 DIAGNOSIS — H20.00 HLA-B27 ASSOCIATED ACUTE ANTERIOR UVEITIS: Primary | ICD-10-CM

## 2024-09-16 ENCOUNTER — TELEPHONE (OUTPATIENT)
Dept: RHEUMATOLOGY | Facility: CLINIC | Age: 54
End: 2024-09-16
Payer: COMMERCIAL

## 2024-09-16 NOTE — TELEPHONE ENCOUNTER
PA Initiation    Medication: HYRIMOZ 40 MG/0.4ML SC SOAJ  Insurance Company: [facility] - Phone [phone]:  (PMAP SECONDARY)  Pharmacy Filling the Rx: [facility] [place]  Filling Pharmacy Phone: [phone]  Filling Pharmacy Fax: [phone]  Start Date: 9/16/2024  DUNCAN (Key: [id number])  PA Case ID #: [id number]          Thank You,     Manisha Soto CPhT  Specialty Pharmacy Clinic [facility] Specialty  [email or web address]  [email or web address]  Phone: [phone]  Fax: [phone]

## 2024-09-18 ENCOUNTER — TELEPHONE (OUTPATIENT)
Dept: RHEUMATOLOGY | Facility: CLINIC | Age: 54
End: 2024-09-18
Payer: COMMERCIAL

## 2024-09-18 NOTE — TELEPHONE ENCOUNTER
PA Initiation    Medication: HUMIRA *CF* PEN 40 MG/0.4ML SC PNKT  Insurance Company: [employer] - Phone [phone] Fax [phone]:  [facility]  Pharmacy Filling the Rx: [facility] - [place], [place] - [facility] [address]   Filling Pharmacy Phone: [phone]  Filling Pharmacy Fax: [phone]  Start Date: 9/18/2024  DUNCAN (Key: [id number])  PA Case ID #: [id number]        Thank You,     Radha Soto CPhT  Specialty Pharmacy Clinic [facility] Specialty  [email or web address]  [email or web address]  Phone: [phone]  Fax: [phone]

## 2024-09-18 NOTE — TELEPHONE ENCOUNTER
Prior Authorization Approval    Medication: HYRIMOZ 40 MG/0.4ML SC SOAJ  Authorization Effective Date: 9/18/2024  Authorization Expiration Date: 9/18/2025  Approved Dose/Quantity: 0.8 ML per 28 days  Reference #: SONG (Key: [id number])   Insurance Company: [facility] - Phone [phone]:  (PMAP SECONDARY)  Expected CoPay: $    CoPay Card Available: No    Financial Assistance Needed: [facility]  Which Pharm[facility] is filling the prescription[facility] [address] [place] [address]  Pharmacy Notified: no - PA required through [facility] PMAP secondary  Patient Notified: no - PA required through [facility] secondary        Thank You,     Radha Soto CPhT  Specialty Pharmacy Clinic [facility] Specialty  [email or web address]  [email or web address]  Phone: [phone]  Fax: [phone]

## 2024-09-18 NOTE — TELEPHONE ENCOUNTER
Additional information requested:    Primary diagnosis - Rheumatoid arthritis    Faxed to plan via RF:        Thank You,     Radha Soto CPhT  Specialty Pharmacy Clinic [facility] Specialty  [email or web address]  [email or web address]  Phone: [phone]  Fax: [phone]

## 2024-09-19 NOTE — TELEPHONE ENCOUNTER
[facility] primary will not approve brand name Humira.    Patient has [employer] PMAP secondary - MUST bill PMAP.    [employer] PMAP will only cover brand name Humira.    Must process at [facility], [facility] to bill [employer] as OCC: 3 - claim rejected - payment indicated.    Thank You,     Radha Soto [facility]  Specialty Pharmacy Clinic [facility] Specialty  [email or web address]  [email or web address]  Phone: [phone]  Fax: [phone]

## 2024-09-19 NOTE — TELEPHONE ENCOUNTER
Prior Authorization Approval    Medication: HUMIRA *CF* PEN 40 MG/0.4ML SC PNKT  Authorization Effective Date: 9/19/2024  Authorization Expiration Date: 9/19/2025  Approved Dose/Quantity: 2 pen kit per 28 days  Reference #: DUNCAN (Key: [id number])   Insurance Company: [facility] - Phone [phone] Fax [phone]:  PMAP  Expected CoPay: $  0  CoPay Card Available: No    Financial Assistance Needed: N/A - PMAP  Which Pharmacy is filling the prescription: [facility] - [place], [place] - [address]  Pharmacy Notified: Yes - New spec med  Patient Notified: Yes - MyChart    Primary insurance: [facility] will not cover brand Humira. Patient has [facility] secondary which will only cover brand Humira. Must COB and bill [facility] with Other coverage code: 3 other coverage billed - claim rejected. [facility] pays for brand Humira with $0 copay.        Thank You,     Radha Soto CPhT  Specialty Pharmacy Clinic [facility] Specialty  [email or web address]  [email or web address]  Phone: [phone]  Fax: [phone]

## 2024-10-17 ENCOUNTER — TELEPHONE (OUTPATIENT)
Dept: RHEUMATOLOGY | Facility: CLINIC | Age: 54
End: 2024-10-17
Payer: COMMERCIAL

## 2024-10-17 DIAGNOSIS — M06.09 RHEUMATOID ARTHRITIS OF MULTIPLE SITES WITH NEGATIVE RHEUMATOID FACTOR (H): Primary | ICD-10-CM

## 2024-10-17 DIAGNOSIS — H20.00 HLA-B27 ASSOCIATED ACUTE ANTERIOR UVEITIS: ICD-10-CM

## 2024-10-17 RX ORDER — ADALIMUMAB-ADAZ 40 MG/.4ML
40 INJECTION, SOLUTION SUBCUTANEOUS
Qty: 0.8 ML | Refills: 4 | Status: SHIPPED | OUTPATIENT
Start: 2024-10-17

## 2024-10-17 NOTE — TELEPHONE ENCOUNTER
Patient called to discuss change to Hyrimoz.    COPAY CARD OBTAINED    Medication: HYRIMOZ 40 MG/0.4ML SC SOAJ  Sponsor: MICHAEL  Member ID: [id number]  BIN: [id number]  PCN:   Group: [id number]  Expected Copay: $  0  Copay card Monthly Max Amount: $    Copay card Annual Amount: $ 5,250     Welcome to [employer] Co-Pay Program for Hyrimoz  (adalimumab-adaz) (50 mg/mL and 100 mg/mL). You may begin using your new card immediately. If you have any questions, please contact [facility] Patient Support Program at [phone]-[facility] ([phone]).            Thank You,     Radha Soto [facility]  Specialty Pharmacy Clinic [facility] Specialty  [email or web address]  [email or web address]  Phone: [phone]  Fax: [phone]

## 2024-10-17 NOTE — TELEPHONE ENCOUNTER
Patient notified of change to bio similar Hyrimoz and [facility] via [facility].    Sent patient copay card sign up link as well.    Thank You,     Radha Soto [facility]  Specialty Pharmacy Clinic Liaison [facility] Specialty  [email or web address]  [email or web address]  Phone: [phone]  Fax: [phone]

## 2024-10-17 NOTE — TELEPHONE ENCOUNTER
Hyrimoz PA on file - Rx needed    Authorization Effective Date: 9/18/2024  Authorization Expiration Date: 9/18/2025   Insurance Company: [facility] - Phone [phone]   Expected CoPay: $    CoPay Card Available:    [email or web address]  Financial Assistance Needed: [email or web address]  Which Pharm[facility] is filling the prescription[facility] - [address] [place] [address] [place]     Pharmacy Notified: Rx needed for [facility]rim[facility]  Patient Notified: Will notify patient of change to Hyrimoz once Rx is placed to new filling [facility]    Thank You,     Manisha Soto CPhT  Specialty Pharmacy Clinic [facility]  [email or web address]  [email or web address]  Phone: [phone]  Fax: [phone]

## 2024-10-17 NOTE — TELEPHONE ENCOUNTER
PA Needed    Medication: Humira Pen CF  QTY/DS: 2 Per 28 days  NEW INS: No  Insurance Company: [employer]    Pharmacy Filling the Rx:    PA :    Date of last fill: 2024    ---  per Previous PA request [employer] will not cover Humira --  Per pt he no longer has [employer] Pmap to bill as secondary.  Will need appeal or New RX for [employer]

## 2024-11-14 ENCOUNTER — TRANSFERRED RECORDS (OUTPATIENT)
Dept: HEALTH INFORMATION MANAGEMENT | Facility: CLINIC | Age: 54
End: 2024-11-14
Payer: COMMERCIAL

## 2025-01-06 ENCOUNTER — TELEPHONE (OUTPATIENT)
Dept: RHEUMATOLOGY | Facility: CLINIC | Age: 55
End: 2025-01-06
Payer: COMMERCIAL

## 2025-01-06 NOTE — TELEPHONE ENCOUNTER
Called pt and let him know  Covid s/s- hold SSZ & leflunomide, and only restart 7 days after last s/s   Biologics hold 2 weeks after last symptom resolves. Suggested he use the virtual urgent care [facility] to get paxlovid.    KELSIE Ardon RN 1/6/2025 3:35 PM

## 2025-01-06 NOTE — TELEPHONE ENCOUNTER
[facility] Call Center    Phone Message    May a detailed message be left on voicemail: yes     Reason for Call: Other: Pt is calling to speak to someone about possibly getting paxlovid. Pt tested positive today for covid. Please send to [facility] - [place], [place] - [address], [address] AT [facility]. Pt is also wondering about what medication he should hold while having covid. Please call pt back at ph: [phone].      Action Taken: Message routed to:  Other: [facility]    Travel Screening: Not Applicable     Date of Service: 1/6

## 2025-02-27 ENCOUNTER — TRANSFERRED RECORDS (OUTPATIENT)
Dept: HEALTH INFORMATION MANAGEMENT | Facility: CLINIC | Age: 55
End: 2025-02-27

## 2025-06-16 ENCOUNTER — OFFICE VISIT (OUTPATIENT)
Dept: RHEUMATOLOGY | Facility: CLINIC | Age: 55
End: 2025-06-16
Payer: COMMERCIAL

## 2025-06-16 ENCOUNTER — TELEPHONE (OUTPATIENT)
Dept: RHEUMATOLOGY | Facility: CLINIC | Age: 55
End: 2025-06-16

## 2025-06-16 VITALS
OXYGEN SATURATION: 100 % | HEART RATE: 83 BPM | SYSTOLIC BLOOD PRESSURE: 163 MMHG | DIASTOLIC BLOOD PRESSURE: 105 MMHG | WEIGHT: 186 LBS

## 2025-06-16 DIAGNOSIS — H20.00 HLA-B27 ASSOCIATED ACUTE ANTERIOR UVEITIS: ICD-10-CM

## 2025-06-16 DIAGNOSIS — M06.4 INFLAMMATORY POLYARTHROPATHY (H): ICD-10-CM

## 2025-06-16 DIAGNOSIS — M06.09 RHEUMATOID ARTHRITIS OF MULTIPLE SITES WITH NEGATIVE RHEUMATOID FACTOR (H): Primary | ICD-10-CM

## 2025-06-16 DIAGNOSIS — Z79.899 HIGH RISK MEDICATIONS (NOT ANTICOAGULANTS) LONG-TERM USE: ICD-10-CM

## 2025-06-16 PROCEDURE — G2211 COMPLEX E/M VISIT ADD ON: HCPCS | Performed by: INTERNAL MEDICINE

## 2025-06-16 PROCEDURE — 99214 OFFICE O/P EST MOD 30 MIN: CPT | Performed by: INTERNAL MEDICINE

## 2025-06-16 RX ORDER — SULFASALAZINE 500 MG/1
1000 TABLET, DELAYED RELEASE ORAL 2 TIMES DAILY
Qty: 360 TABLET | Refills: 0 | Status: SHIPPED | OUTPATIENT
Start: 2025-06-16

## 2025-06-16 RX ORDER — LEFLUNOMIDE 10 MG/1
10 TABLET ORAL DAILY
Qty: 90 TABLET | Refills: 2 | Status: SHIPPED | OUTPATIENT
Start: 2025-06-16

## 2025-06-16 NOTE — TELEPHONE ENCOUNTER
PA Initiation    Medication: SIMPONI 50 MG/0.5ML SC SOAJ  Insurance Company: [facility] - Phone [phone]  Pharm[facility] Rx[facility] [place]  Filling Pharmacy Phone: [phone]  Filling Pharmacy Fax: [phone]  Start Date: 6/16/2025  DUNCAN (Key: [id number])  PA Case ID #: [id number]        Thank You,     Manisha Soto CPhT  Specialty Pharmacy Clinic [facility] Specialty  [email or web address]  [email or web address]  Phone: [phone]  Fax: [phone]

## 2025-06-16 NOTE — PROGRESS NOTES
Rheumatology Clinic Visit      Bry Bingham MRN# [id number]   YOB: 1970 Age: 55 year old      Date of visit: 6/16/25   Ophthalmologist: Dr. Luis Eduardo Young from [facility]   PCP: Hood Melo at [facility]    Chief Complaint   Patient presents with:  RECHECK    Assessment and Plan     1.  HLA-B27 associated recurrent left anterior uveitis: History of acute onset per ophthalmologist.  HLA-B27 positive.  History of rheumatoid arthritis per patient's initial report.  Sudden onset unilateral anterior uveitis suggestive of an HLA-B27 association.  Reportedly uveitis since 2009 and was treated with methotrexate successfully for many years but then he stopped methotrexate because of associated fatigue and did well off of treatment for 3-4 years until the uveitis flared, requiring steroid eyedrops twice daily that only partially controlled the left eye symptoms.  Leflunomide 20 mg daily was started with good benefit but liver enzymes were elevated so leflunomide was reduced to 10 mg daily.  He then presented with symmetric synovitis of the MCPs so sulfasalazine was added with resolution of the inflammatory arthritis symptoms at that time.  Later with more active disease so adalimumab 40 mg SQ every 14 days (Hyrimoz, started 9/28/2024) was effective until more recently.  Sulfasalazine dose increased resulted in leukopenia/neutropenia and no improved arthritis control so will reduce sulfasalazine to the previous dose and change the biologic DMARD.  Synovitis on exam today.  Change adalimumab to golimumab.  Okay for Orencia or Rogelio inhibitor if required by insurance.  Chronic illness, progressive  - Reduce sulfasalazine from 1500 mg twice daily, to 1000 mg twice daily  - Continue leflunomide 10mg daily  (historically had LFT elevation with 20 mg daily)  - Discontinue adalimumab (Hyrimoz) 40 mg SQ every 14 days  - Start Simponi 50 mg SQ every 28 days  - Labs in 3 months: CBC,  Creatinine, Hepatic Panel, ESR, CRP (printed orders given to the patient today to have labs completed at [facility])    High risk medication requiring intensive toxicity monitoring at least quarterly    # Golimumab (Simponi) Risks and Benefits: The risks and benefits of golimumab were discussed in detail and the patient verbalized understanding.  The risks discussed include, but are not limited to, the risk for hypersensitivity, anaphylaxis, anaphylactoid reactions, an increased risk for serious infections leading to hospitalization or death, a possible increased risk for lymphoma and other malignancies, a possible worsening of demyelinating diseases, a possible worsening of heart failure, risk for cytopenias, risk for drug induced lupus, possible reactivation of hepatitis B, and possible reactivation of latent tuberculosis.  Subcutaneous injections may result in injection site reactions and/or pain at the site of injection.  The most common adverse reactions are infections and injection site reactions.  It was discussed that the medication would need to be discontinued if a serious infection develops.  It was discussed that live vaccinations should not be received while using golimumab or within 30 days prior to starting golimumab.  I encouraged reviewing the package insert and asking any questions about the medication.      # Abatacept (Orencia) Risks and Benefits: The risks and benefits of abatacept were discussed in detail and the patient verbalized understanding.  The risks discussed include, but are not limited to, the risk for hypersensitivity, anaphylaxis, anaphylactoid reactions, an increased risk for serious infections leading to hospitalization or death, and an increased risk for more frequent respiratory adverse events in patients with COPD.  If subcutaneous injections are used, then injection site reactions and/or pain may occur at the site of injection.  The most common side effects were  discussed that include headache, upper respiratory tract infections, nasopharyngitis, and nausea.  It was discussed that the medication would need to be discontinued if a serious infection develops.  It was discussed that live vaccinations should not be received while using abatacept or within 30 days prior to starting abatacept.  I encouraged reviewing the package insert and asking any questions about the medication.      # Tofacitinib (Xeljanz) Risks and Benefits: The risks and benefits of Xeljanz were discussed in detail and the patient verbalized understanding.  The risks discussed include, but are not limited to, the risk for hypersensitivity, anaphylaxis, anaphylactoid reactions, an increased risk for serious infections leading to hospitalization or death, increased risk for lymphoma and other malignancies, an increased risk for gastrointestinal perforation, worsening triglycerides.  The most common adverse reactions were upper respiratory tract infections, headache, diarrhea, and nasopharyngitis.  In addition to routine laboratory monitoring during therapy, labs including CBC, CMP, and lipid panel will be required at the start of therapy and 8 weeks after starting therapy.  I encouraged reviewing the package insert and asking any questions about the medication.     # Rinvoq (Upadacitinib) Risks and Side Effects: The risks and potential side effects of upadacitinib were discussed in detail and the patient verbalized understanding.  The risks discussed include, but are not limited to, the risk for hypersensitivity, anaphylaxis, anaphylactoid reactions, an increased risk for serious infections leading to hospitalization or death, a possible increased risk for lymphoma and other malignancies, hematologic toxicity (cytopenias), GI perforation possibility so if any worsening GI symptoms then he will need prompt evaluation, hepatotoxicity, lipid abnormalities, and a higher risk for thrombosis that includes deep vein  thrombosis, pulmonary embolism, and arterial thrombosis, possible reactivation of hepatitis B, and possible reactivation of latent tuberculosis.  It was discussed that the medication would need to be discontinued if a serious infection develops.  It was discussed that live vaccinations should not be received while using upadacitinib or within 30 days prior to starting upadacitinib.  I encouraged reviewing the package insert and asking any questions about the medication.      2. Rheumatoid Arthritis: RA versus HLA-B27 SpA. See #1.  Controlled on sulfasalazine and leflunomide previously but more active disease today so adding adalimumab as noted in #1.  Chronic illness, progressive.        3. Chewing tobacco: Advised complete cessation     4.  Bilateral knee osteoarthritis and hand osteoarthritis:  Heberden's and Gwen's nodes present.    Topical Voltaren gel as needed; this is effective.  Encouraged physical therapy exercises for his knees on a daily basis.  Doing well at this time.    5.  Vaccinations: Vaccinations reviewed with Mr. Lazoak.  Risks and benefits of vaccinations were discussed.    - Influenza: encouraged yearly vaccination  - [id number]: up to date  - [id number]: up to date  - Shingrix: up to date  - COVID-19: Advised keeping updated, and to hold sulfasalazine and leflunomide for 1-2 weeks afterward    6. Elevated blood pressure:  Bry to follow up with Primary Care provider regarding elevated blood pressure.      Total minutes spent in evaluation with patient, documentation, , and review of pertinent studies and chart notes: 20  The longitudinal plan of care for the rheumatology problem(s) were addressed during this visit.  Due to added complexity of care, we will continue to support the patient and the subsequent management of this condition with ongoing continuity of care.    Mr. Bingham verbalized agreement with and understanding of the rational for the diagnosis and treatment plan.   All questions were answered to best of my ability and the patient's satisfaction. Mr. Bingham was advised to contact the clinic with any questions that may arise after the clinic visit.      Thank you for involving me in the care of the patient    Return to clinic: 3 months, in office    HPI   Bry Bingham is a 55 year old male with a past medical history significant for iritis and rheumatoid arthritis who is seen for follow-up of recurrent left acute anterior uveitis.    2/20/2023: Bony hypertrophy on the dorsal aspect of the left thumb IP joint that is without increased warmth or overlying erythema, sometimes bothersome with increased activity, and improves with rest.  Other joints are doing well.  Knee pain responded well to topical Voltaren gel as needed, used infrequently.  Hypertension now also treated with amlodipine 2.5 mg daily that was started by his primary care provider with now good control of the hypertension per patient.  Morning stiffness for no more than 10 minutes.  No joint swelling.  No recurrence of uveitis.  Reports that he is happy with how well he is doing.  No longer having fatigue.    8/18/2023: Thumb osteoarthritis improved with Voltaren gel and stretching exercises.  Other joints are doing well.  No other joint pain.  Morning stiffness for no more than 10 minutes.  No stomach upset associated with sulfasalazine or leflunomide.  No fatigue.  States that he saw his ophthalmologist within the last month and reports that the uveitis is quiescent; and is not requiring eyedrops.  No dry eye or dry mouth.  Overall states that he is happy with how well he is doing.  Labs were done earlier this week; awaiting results to be sent to me.    2/16/2024: Currently doing well.  Thumb IP Bony hypertrophy with pain when more active, rest resolves pain; minimal symptoms; topical Voltaren gel of minimal benefit.  No other joint pain or swelling.  No morning stiffness or gelling phenomenon.  No recurrence of  uveitis.  Planning to have labs completed next week.  Still chewing tobacco with no plans for cessation.    8/29/2024: More pain at the MCPs bilaterally, worse at the right second and left third MCPs.  MCP pain is worse in the morning and improves with time and activity.  Morning stiffness in the MCPs, PIPs, wrists, and ankles for at least 2 hours each morning.  Mild swelling at the MCPs.  No swelling of the ankles.  No recurrence of uveitis.    12/13/2024: No significant improvement of the pain at the MCPs and still with mild swelling at these joints.  Wrist and ankles are improved.  Morning stiffness reduced from 2 hours to 15 minutes.  Arthritis not limiting daily activities.  No recurrence of uveitis.    3/14/2025: Patient reports that he continues to have pain and stiffness without swelling for 1 hour each morning that is bothersome.  Other joints are doing well.  He held all DMARDs when he was diagnosed with COVID recently and had significantly worsened joint pain during that time.  He reports that labs were completed last month at [facility], that is now called [facility].     Today, 6/16/2025: Joint pain at the MCPs, wrist, and right ankle and occasionally the right knee.  Symptoms are typically worse in the morning and improved with time and activity.  Morning stiffness lasting all day, worse in the morning.  No improvement with sulfasalazine dose increase. No uveitis recurrence    Denies fevers, chills, nausea, vomiting, constipation, diarrhea. No abdominal pain. No chest pain/pressure, palpitations, or shortness of breath. No LE swelling. No neck pain.  No lower back pain or lower back stiffness.  No oral or nasal sores.  No rash.     Tobacco: Chewing tobacco; trying to cut down  EtOH: 1-2 drinks every 2 weeks  Drugs: None  Occupation:  for [employer]    ROS   12 point review of system was completed and negative except as noted in the HPI     Active Problem List     Patient Active  Problem List   Diagnosis    HLA-B27 associated acute anterior uveitis     Past Medical History   No past medical history on file.  Past Surgical History   No past surgical history on file.  Allergy   No Known Allergies  Current Medication List     Current Outpatient Medications   Medication Sig Dispense Refill    adalimumab-adaz (HYRIMOZ) 40 MG/0.4ML auto-injector kit Inject 0.4 mLs (40 mg) subcutaneously every 14 days. .  Hold for infection and seek medical attention 0.8 mL 4    amLODIPine (NORVASC) 2.5 MG tablet Take 1 tablet (2.5 mg) by mouth daily      diclofenac (VOLTAREN) 1 % topical gel Apply up to 2 grams of 1% gel to upper extremity and up to 4 grams of 1% gel to lower extremity up to 4 times daily as needed for joint pain.  Use for hand and knee osteoarthritis 200 g 2    leflunomide (ARAVA) 10 MG tablet Take 1 tablet (10 mg) by mouth daily. .  Labs required every 3 months. 90 tablet 2    lisinopril (ZESTRIL) 10 MG tablet       sulfaSALAzine ER (AZULFIDINE EN) 500 MG EC tablet Take 3 tablets (1,500 mg) by mouth 2 times daily. Labs required monthly for the first 3 months after dose increase, then every 3 months thereafter 540 tablet 0     No current facility-administered medications for this visit.         Social History   See HPI    Family History     Denies family history of rheumatologic disease    Physical Exam     Temp Readings from Last 3 Encounters:   12/17/18 98  F (36.7  C) (Oral)     BP Readings from Last 5 Encounters:   08/29/24 123/83   04/18/22 130/86   11/22/19 130/88   08/02/19 134/80   05/10/19 136/88     Pulse Readings from Last 1 Encounters:   08/29/24 81     Resp Readings from Last 1 Encounters:   08/29/24 16     There is no height or weight on file to calculate BMI.      GEN: NAD. Healthy appearing adult.   HEENT:  Anicteric, noninjected sclera. No obvious external lesions of the ear and nose. Hearing intact.  CV: S1, S2. RRR. No m/r/g  PULM: No increased work of breathing. CTA  bilaterally   MSK: Synovial swelling and tenderness to palpation of the bilateral third MCPs and wrists.  Other MCPs without swelling or tenderness to palpation.  PIPs and DIPs without swelling or tenderness to palpation. Elbows and shoulders without swelling or tenderness to palpation.  Knees, left ankle, and MTPs without swelling or tenderness to palpation.  Right ankle mildly tender to palpation but no swelling, increased warmth, or overlying erythema.  Achilles tendons nontender to palpation.  SKIN: No rash or jaundice seen  PSYCH: Alert. Appropriate.      Labs / Imaging (select studies)     [facility] labs on 12/3/2012: HLA-B27 positive    8/8/2022 [facility] labs okay  11/2/2022 [facility] labs were okay  5/9/2023 [facility] labs were okay  11/22/2023 [facility] labs: QuantiFERON-TB gold plus negative  11/8/2023: [facility] labs: Creatinine, AST, ALT normal.  CBC normal  11/14/2024 [facility] labs showed a normal CBC, creatinine, hepatic panel  4/18/2025 [facility] labs: WBC 3.26, ANC 1.25  6/13/2025 [facility] labs: Normal ESR, CRP, creatinine, hepatic panel; CBC shows a WBC of 3.82 with an ANC of 1.4    RF/CCP  Recent Labs   Lab Test 08/06/18  0929   CCPIGG 1   RHF <20     Immunization History     Immunization History   Administered Date(s) Administered    COVID-19 12+ (Pfizer) 10/27/2023, 11/01/2024    COVID-19 Bivalent 12+ (Pfizer) 10/04/2022    COVID-19 MONOVALENT 12+ (Pfizer) 03/21/2021, 04/11/2021, 10/18/2021    COVID-19 Monovalent 18+ (Moderna) 04/25/2022    Pneumo Conj 13-V (2010&after) 12/17/2018    Pneumococcal 23 valent 05/10/2019    TDAP (Adacel,Boostrix) 08/29/2024          Chart documentation done in part with Dragon Voice recognition Software. Although reviewed after completion, some word and grammatical error may remain.    Charles Bunn MD

## 2025-06-19 NOTE — TELEPHONE ENCOUNTER
PRIOR AUTHORIZATION DENIED    Medication: SIMPONI 50 MG/0.5ML SC SOAJ  Insurance Company: [facility] - Phone [phone]  Denial Date: 6/19/2025  Denial Reason(s): The primary preferred drugs you can give to yourself (self-administered)  for your plan are: A) adalimumab-adaz, B) adalimumab-fkjp, C) Enbrel, D) Hyrimoz, E) Kevzara, F) Orencia subcutaneous/Orencia ClickJect, G) Rinvoq, and H) Xeljanz/Xeljanz XR.      Appeal Information:       Patient Notified: NO - prescriber please advise - Appeal or try alternative?    Thank You,     Radha Soto CPhT  Specialty Pharmacy Clinic [facility] Specialty  [email or web address]  [email or web address]  Phone: [phone]  Fax: [phone]

## 2025-06-19 NOTE — TELEPHONE ENCOUNTER
[facility]M referral to assist with appeal to continue with plan for Simponi, prefer TNF inhibitor considering uveitis history.     Charles Bunn MD  6/19/2025

## 2025-06-21 ENCOUNTER — HEALTH MAINTENANCE LETTER (OUTPATIENT)
Age: 55
End: 2025-06-21

## 2025-06-26 ENCOUNTER — TELEPHONE (OUTPATIENT)
Dept: RHEUMATOLOGY | Facility: CLINIC | Age: 55
End: 2025-06-26
Payer: COMMERCIAL

## 2025-06-26 NOTE — TELEPHONE ENCOUNTER
Called P: [phone]     No answer. Left voicemail to call me back at [phone] to discuss approval..    Thank You,     Radha Soto [facility]  Specialty Pharmacy Clinic [facility] Specialty  [email or web address]  [email or web address]  Phone: [phone]  Fax: [phone]

## 2025-06-26 NOTE — TELEPHONE ENCOUNTER
Office called back: they did not know what I was calling about...    Transferred to Dr. Green. Left voicemail.     Thank You,     Radha Soto [facility]  Specialty Pharmacy Clinic Liaison [facility] Specialty  [email or web address]  [email or web address]  Phone: [phone]  Fax: [phone]

## 2025-06-26 NOTE — TELEPHONE ENCOUNTER
Dr. Green called back, discussed that Simponi self-injection is preferred because patient would have difficulty getting into clinic on monthly basis for infusion.    Also discussed that Cimzia is not preferred because it is only available as a pre-filled syringe, and an auto-injector is preferred due to injection difficulty with pre-filled syringe.    Thank You,     Radha Soto [facility]  Specialty Pharmacy Clinic [facility] Specialty  [email or web address]  [email or web address]  Phone: [phone]  Fax: [phone]

## 2025-06-26 NOTE — TELEPHONE ENCOUNTER
[facility] Call Center    Phone Message    May a detailed message be left on voicemail: yes     Reason for Call: Dr. Batsheva watkins's insurance is requesting a call back to discuss approval for simponi. P: [phone] (okay to leave detailed message)    Action Taken: Other: RHEUM    Travel Screening: Not Applicable     Date of Service:

## 2025-07-08 NOTE — TELEPHONE ENCOUNTER
Simponi appeal approved.    Thank You,     Radha Soto [facility]  Specialty Pharmacy Clinic [facility] Specialty  [email or web address]  [email or web address]  Phone: [phone]  Fax: [phone]